# Patient Record
Sex: FEMALE | Race: WHITE | NOT HISPANIC OR LATINO | Employment: OTHER | URBAN - METROPOLITAN AREA
[De-identification: names, ages, dates, MRNs, and addresses within clinical notes are randomized per-mention and may not be internally consistent; named-entity substitution may affect disease eponyms.]

---

## 2023-04-24 PROBLEM — E03.9 HYPOTHYROIDISM: Status: ACTIVE | Noted: 2023-04-24

## 2023-04-24 PROBLEM — R35.0 URINARY FREQUENCY: Status: ACTIVE | Noted: 2023-04-24

## 2023-04-24 PROBLEM — F43.10 PTSD (POST-TRAUMATIC STRESS DISORDER): Status: ACTIVE | Noted: 2023-04-24

## 2023-04-24 PROBLEM — R03.0 ELEVATED BP WITHOUT DIAGNOSIS OF HYPERTENSION: Status: ACTIVE | Noted: 2023-04-24

## 2023-04-24 PROBLEM — M81.0 OSTEOPOROSIS: Status: ACTIVE | Noted: 2023-04-24

## 2023-04-24 PROBLEM — E78.9 ABNORMAL CHOLESTEROL TEST: Status: ACTIVE | Noted: 2023-04-24

## 2023-04-24 RX ORDER — POLYETHYLENE GLYCOL 3350 17 G/17G
17 POWDER, FOR SOLUTION ORAL ONCE
COMMUNITY
Start: 2020-02-06

## 2023-04-24 RX ORDER — FLAXSEED OIL 1000 MG
1 CAPSULE ORAL DAILY
COMMUNITY
Start: 2014-08-28 | End: 2023-04-25 | Stop reason: ALTCHOICE

## 2023-04-24 RX ORDER — BIOTIN 1 MG
1 TABLET ORAL DAILY
COMMUNITY
Start: 2022-11-14

## 2023-04-24 RX ORDER — LEVOTHYROXINE SODIUM 88 UG/1
88 TABLET ORAL
COMMUNITY
Start: 2013-09-09 | End: 2023-11-07

## 2023-04-24 RX ORDER — IBUPROFEN 200 MG
1 TABLET ORAL DAILY
COMMUNITY
Start: 2022-11-14

## 2023-04-24 RX ORDER — CHOLECALCIFEROL (VITAMIN D3) 25 MCG
1 TABLET ORAL DAILY
COMMUNITY
Start: 2017-07-03

## 2023-04-24 NOTE — PROGRESS NOTES
Subjective   Patient ID: Jacqueline Cam is a 78 y.o. female who presents for follow-up visit    HPI   The patient reports a history of constant swelling over the PIP joint of the right thumb over the past month.  She does report that the region has been mildly painful to palpation.  She reports no associated symptoms.  The patient does report that she is right-handed and she has been playing her flute more often over the past month or so.    Over the past 10 days, she also reports a history of daily episodes of aching pain originating in the frontotemporal regions radiating back to the occipital region.  She reports that she will not wake up with the episodes but that the episodes do develop soon after she does wake up.  She reports that the duration of each episode is several hours.  She reports no precipitating, exacerbating or relieving factors.  She does report associated nausea intermittently.  She reports no associated visual changes, slurred speech, focal weakness/paresthesias, phonophobia, photophobia.  Review of Systems    Objective   There were no vitals taken for this visit.    Physical Exam  Eyes-extraocular movements intact.  Pupils equal react to light.  Fundi revealed good retinal color, no hemorrhages or exudates  Musculoskeletal  PIP joint right thumb-diffuse swelling noted but more so at the ulnar surface of the joint.  Full range of motion in all directions of motion with no pain.  Palpation revealed no tenderness or increase in warmth.  Neurologic  Mental status-alert and oriented x3   Cranial nerves-2 through 12 grossly intact, no visual field abnormalities  Motor-no pronator drift noted, strength-5/5 in all muscle groups tested, , no tremor noted.  No bradykinesia noted.  No rigidity noted.  Negative pull test  Sensory-Light touch sensation fully intact  Pinprick sensation fully intact  Vibratory sensation fully intact  Cerebellar-no truncal ataxia, good coordination finger-nose testing,,  good coordination heel-to-shin testing, normal rapid alternating movements  Romberg negative, no abnormality in tandem gait  Reflexes-2+/4 bilaterally except ankle jerks 1+/4 bilaterally  Assessment/Plan        Assessment  Constant swelling noted over the PIP joint of the right thumb-may be secondary to osteoarthritis.  Daily episodes of aching pain in the frontotemporal regions radiating back to the occipital region with associated nausea intermittently-unsure of etiology.  May be secondary to muscle tension headache, chronic daily migraine-unlikely, increased intraocular pressure-unlikely.  I suppose temporal arteritis is a possible etiology but this is less likely.  Plan  Obtain sed rate, TSH, CRP today.  I told the patient that she should cut back on the frequency of playing her flute.  I also recommended that she begin use of Tylenol 1000 mg every 6 hours as needed for the aforementioned headaches.

## 2023-04-25 ENCOUNTER — OFFICE VISIT (OUTPATIENT)
Dept: PRIMARY CARE | Facility: CLINIC | Age: 78
End: 2023-04-25
Payer: MEDICARE

## 2023-04-25 ENCOUNTER — LAB (OUTPATIENT)
Dept: LAB | Facility: LAB | Age: 78
End: 2023-04-25
Payer: MEDICARE

## 2023-04-25 VITALS
DIASTOLIC BLOOD PRESSURE: 84 MMHG | WEIGHT: 128 LBS | HEART RATE: 76 BPM | BODY MASS INDEX: 23.56 KG/M2 | SYSTOLIC BLOOD PRESSURE: 110 MMHG

## 2023-04-25 DIAGNOSIS — K58.1 IRRITABLE BOWEL SYNDROME WITH PREDOMINANT CONSTIPATION: ICD-10-CM

## 2023-04-25 DIAGNOSIS — E03.9 ACQUIRED HYPOTHYROIDISM: ICD-10-CM

## 2023-04-25 DIAGNOSIS — R03.0 ELEVATED BP WITHOUT DIAGNOSIS OF HYPERTENSION: Primary | ICD-10-CM

## 2023-04-25 DIAGNOSIS — M81.0 AGE-RELATED OSTEOPOROSIS WITHOUT CURRENT PATHOLOGICAL FRACTURE: ICD-10-CM

## 2023-04-25 DIAGNOSIS — R35.0 URINARY FREQUENCY: ICD-10-CM

## 2023-04-25 DIAGNOSIS — R03.0 ELEVATED BP WITHOUT DIAGNOSIS OF HYPERTENSION: ICD-10-CM

## 2023-04-25 DIAGNOSIS — G44.52 NEW DAILY PERSISTENT HEADACHE: ICD-10-CM

## 2023-04-25 LAB
C REACTIVE PROTEIN (MG/L) IN SER/PLAS: <0.1 MG/DL
SEDIMENTATION RATE, ERYTHROCYTE: 3 MM/H (ref 0–30)
THYROTROPIN (MIU/L) IN SER/PLAS BY DETECTION LIMIT <= 0.05 MIU/L: 1.23 MIU/L (ref 0.44–3.98)

## 2023-04-25 PROCEDURE — 36415 COLL VENOUS BLD VENIPUNCTURE: CPT

## 2023-04-25 PROCEDURE — 99213 OFFICE O/P EST LOW 20 MIN: CPT | Performed by: INTERNAL MEDICINE

## 2023-04-25 PROCEDURE — 85652 RBC SED RATE AUTOMATED: CPT

## 2023-04-25 PROCEDURE — 84443 ASSAY THYROID STIM HORMONE: CPT

## 2023-04-25 PROCEDURE — 86140 C-REACTIVE PROTEIN: CPT

## 2023-11-07 DIAGNOSIS — E03.9 ACQUIRED HYPOTHYROIDISM: ICD-10-CM

## 2023-11-07 RX ORDER — LEVOTHYROXINE SODIUM 88 UG/1
88 TABLET ORAL DAILY
Qty: 90 TABLET | Refills: 1 | Status: SHIPPED | OUTPATIENT
Start: 2023-11-07 | End: 2024-05-21

## 2023-12-19 NOTE — PROGRESS NOTES
Subjective   Patient ID: Jacqueline Cam is a 78 y.o. female who presents for right ear pain    HPI   The patient reports a history of intermittent episodes of discomfort in the right ear region and under the proximal end of the right mandible beginning in late November.  She reports that the discomfort was not affected by chewing, swallowing, she reports no drainage from the ear.  She does report a history of rhinorrhea and postnasal drip which have been present since the weather change-unchanged towards the end of November.  She reports no other associated symptoms.  The patient presented to the urgent care center on November 27.  She was evaluated and diagnosed with what sounds like otitis externa.  She was given Cortisporin otic solution which she used for 1 week with no change in the above symptoms.  Over the past few days, she reports a history of a constant aching discomfort in the right ear-again not affected by chewing, swallowing.  She also reports diminished hearing in the right ear 2 days..  This morning, the patient experienced a brief episode of vertigo when she woke up in bed with her head turned to the right she then had another momentary episode of vertigo when she turns her head to the left.  She reports no other associated symptoms.  She still reports continued postnasal drip and rhinorrhea-unchanged.  Review of Systems    Objective   There were no vitals taken for this visit.    Physical Exam  Head-palpation revealed no tenderness over the maxillary or frontal sinuses  Right TMJ-no erythema or swelling.  Full range of motion with no pain or click  Ears  Right ear-palpation of the pinna and tragus revealed no increased discomfort.  External auditory canal not erythematous or swollen, TM clear  Nose-turbinates not erythematous or swollen, no septal deviation noted..  Mouth-posterior pharynx not erythematous, tonsillar pillars appeared normal, no exudates  Neck-no lymphadenopathy..  Thyroid gland  not enlarged  Neurologic  Mental status-alert and oriented x3   Cranial nerves-2 through 12 grossly intact, no visual field abnormalities  Motor-no pronator drift noted, strength-5/5 in all muscle groups tested, , no tremor noted.  No bradykinesia noted.  No rigidity noted.  Negative pull test  Sensory-Light touch sensation fully intact  Pinprick sensation fully intact  Vibratory sensation fully intact  Cerebellar-no truncal ataxia, good coordination finger-nose testing,, good coordination heel-to-shin testing, normal rapid alternating movements  Romberg negative, no abnormality in tandem gait  Reflexes-2+/4 bilaterally     Green-Hallpike maneuver negative with rotation of the head bilaterally  Assessment/Plan        Assessment  Constant aching discomfort in the right ear with associated hearing loss-unsure of etiology.  Eustachian tube dysfunction I suppose is a possible etiology.  History of 2 brief episodes of vertigo this morning-may have been secondary to benign paroxysmal positional vertigo  Plan  Begin Flonase spray 1 spray to the right nostril twice daily and Allegra 60 mg p.o. twice daily for the next 10 days.  The patient will return for a follow-up visit in 10 days; she will call me sooner if symptoms progress

## 2023-12-20 ENCOUNTER — OFFICE VISIT (OUTPATIENT)
Dept: PRIMARY CARE | Facility: CLINIC | Age: 78
End: 2023-12-20
Payer: MEDICARE

## 2023-12-20 ENCOUNTER — PHARMACY VISIT (OUTPATIENT)
Dept: PHARMACY | Facility: CLINIC | Age: 78
End: 2023-12-20

## 2023-12-20 VITALS — DIASTOLIC BLOOD PRESSURE: 76 MMHG | HEART RATE: 70 BPM | SYSTOLIC BLOOD PRESSURE: 100 MMHG

## 2023-12-20 DIAGNOSIS — H92.01 OTALGIA OF RIGHT EAR: Primary | ICD-10-CM

## 2023-12-20 DIAGNOSIS — E03.9 ACQUIRED HYPOTHYROIDISM: ICD-10-CM

## 2023-12-20 PROCEDURE — RXOTC WILLOW AMBULATORY OTC CHARGE

## 2023-12-20 PROCEDURE — 99213 OFFICE O/P EST LOW 20 MIN: CPT | Performed by: INTERNAL MEDICINE

## 2023-12-20 PROCEDURE — 1160F RVW MEDS BY RX/DR IN RCRD: CPT | Performed by: INTERNAL MEDICINE

## 2023-12-20 PROCEDURE — 1159F MED LIST DOCD IN RCRD: CPT | Performed by: INTERNAL MEDICINE

## 2023-12-28 NOTE — PROGRESS NOTES
Subjective   Patient ID: Jacqueline Cam is a 78 y.o. female who presents for 10-day follow-up visit    HPI   Since the day after the patient's last office visit, she reports constant sensation of fullness in the right ear which has been waxing and waning since then..  She reports that the sense of fullness has not been affected by chewing or swallowing.  She reports no drainage from the ear.  Over the past week, she reports no recurrent episodes of vertigo, no aching in the right ear and reports no hearing loss..  The patient has been using Flonase spray 1 spray to each nostril twice daily and either Allegra or Claritin on a daily basis  Review of Systems    Objective   There were no vitals taken for this visit.    Physical Exam  Head-palpation revealed no tenderness over the maxillary or frontal sinuses  Right TMJ-no erythema or swelling.  Full range of motion with no pain or click  Ears  Right ear-palpation of the pinna and tragus revealed no increased discomfort.  External auditory canal not erythematous or swollen, TM clear  Nose-turbinates not erythematous or swollen, no septal deviation noted..  Mouth-posterior pharynx not erythematous, tonsillar pillars appeared normal, no exudates  Neck-no lymphadenopathy..  Thyroid gland not enlarged  Assessment/Plan        Assessment/Plan    Assessment  Constant sense of fullness right ear-may be secondary to eustachian tube dysfunction  Plan  Refer to ENT for further evaluation and treatment    Plan

## 2023-12-29 ENCOUNTER — OFFICE VISIT (OUTPATIENT)
Dept: PRIMARY CARE | Facility: CLINIC | Age: 78
End: 2023-12-29
Payer: MEDICARE

## 2023-12-29 VITALS
BODY MASS INDEX: 23.04 KG/M2 | HEART RATE: 60 BPM | SYSTOLIC BLOOD PRESSURE: 100 MMHG | DIASTOLIC BLOOD PRESSURE: 70 MMHG | WEIGHT: 125.2 LBS

## 2023-12-29 DIAGNOSIS — H92.01 OTALGIA OF RIGHT EAR: ICD-10-CM

## 2023-12-29 DIAGNOSIS — H93.8X1 BLOCKED EAR, RIGHT: Primary | ICD-10-CM

## 2023-12-29 PROCEDURE — 1160F RVW MEDS BY RX/DR IN RCRD: CPT | Performed by: INTERNAL MEDICINE

## 2023-12-29 PROCEDURE — 1159F MED LIST DOCD IN RCRD: CPT | Performed by: INTERNAL MEDICINE

## 2023-12-29 PROCEDURE — 99212 OFFICE O/P EST SF 10 MIN: CPT | Performed by: INTERNAL MEDICINE

## 2024-01-24 ENCOUNTER — APPOINTMENT (OUTPATIENT)
Dept: OTOLARYNGOLOGY | Facility: HOSPITAL | Age: 79
End: 2024-01-24
Payer: MEDICARE

## 2024-02-06 ENCOUNTER — OFFICE VISIT (OUTPATIENT)
Dept: OTOLARYNGOLOGY | Facility: CLINIC | Age: 79
End: 2024-02-06
Payer: MEDICARE

## 2024-02-06 ENCOUNTER — CLINICAL SUPPORT (OUTPATIENT)
Dept: AUDIOLOGY | Facility: CLINIC | Age: 79
End: 2024-02-06
Payer: MEDICARE

## 2024-02-06 VITALS — WEIGHT: 125 LBS | HEIGHT: 62 IN | BODY MASS INDEX: 23 KG/M2

## 2024-02-06 DIAGNOSIS — H93.13 TINNITUS, BILATERAL: ICD-10-CM

## 2024-02-06 DIAGNOSIS — H90.3 SENSORINEURAL HEARING LOSS (SNHL) OF BOTH EARS: ICD-10-CM

## 2024-02-06 DIAGNOSIS — H69.91 DYSFUNCTION OF RIGHT EUSTACHIAN TUBE: ICD-10-CM

## 2024-02-06 DIAGNOSIS — H69.91 DYSFUNCTION OF RIGHT EUSTACHIAN TUBE: Primary | ICD-10-CM

## 2024-02-06 DIAGNOSIS — M26.629 TMJ SYNDROME: ICD-10-CM

## 2024-02-06 DIAGNOSIS — H90.A31 MIXED CONDUCTIVE AND SENSORINEURAL HEARING LOSS OF RIGHT EAR WITH RESTRICTED HEARING OF LEFT EAR: ICD-10-CM

## 2024-02-06 DIAGNOSIS — H90.3 SENSORINEURAL HEARING LOSS (SNHL) OF BOTH EARS: Primary | ICD-10-CM

## 2024-02-06 DIAGNOSIS — H93.8X1 BLOCKED EAR, RIGHT: ICD-10-CM

## 2024-02-06 DIAGNOSIS — J30.89 NON-SEASONAL ALLERGIC RHINITIS DUE TO OTHER ALLERGIC TRIGGER: ICD-10-CM

## 2024-02-06 DIAGNOSIS — H92.01 REFERRED OTALGIA OF RIGHT EAR: ICD-10-CM

## 2024-02-06 PROBLEM — I00 RHEUMATIC ARTERITIS: Status: ACTIVE | Noted: 2024-02-06

## 2024-02-06 PROCEDURE — 92567 TYMPANOMETRY: CPT | Performed by: AUDIOLOGIST

## 2024-02-06 PROCEDURE — 92557 COMPREHENSIVE HEARING TEST: CPT | Performed by: AUDIOLOGIST

## 2024-02-06 PROCEDURE — 99204 OFFICE O/P NEW MOD 45 MIN: CPT | Performed by: OTOLARYNGOLOGY

## 2024-02-06 RX ORDER — AZELASTINE 1 MG/ML
2 SPRAY, METERED NASAL 2 TIMES DAILY
Qty: 90 ML | Refills: 0 | Status: SHIPPED | OUTPATIENT
Start: 2024-02-06

## 2024-02-06 RX ORDER — METHYLPREDNISOLONE 4 MG/1
TABLET ORAL
Qty: 21 TABLET | Refills: 0 | Status: SHIPPED | OUTPATIENT
Start: 2024-02-06 | End: 2024-02-07 | Stop reason: WASHOUT

## 2024-02-06 RX ORDER — FLUTICASONE PROPIONATE 50 MCG
2 SPRAY, SUSPENSION (ML) NASAL DAILY
Qty: 48 ML | Refills: 0 | Status: SHIPPED | OUTPATIENT
Start: 2024-02-06

## 2024-02-06 ASSESSMENT — ENCOUNTER SYMPTOMS
RHINORRHEA: 1
TROUBLE SWALLOWING: 1
DIZZINESS: 1
ROS GI COMMENTS: HEARTBURN
EYE ITCHING: 1

## 2024-02-06 NOTE — PROGRESS NOTES
AUDIOLOGIC EVALUATION    Name:  Jacqueline Cam  :  1945  Age:  78 y.o.    HISTORY:     Patient reported her right ear to feel like she is underwater for the past 6 to 8 weeks.  She stated that it started with tingling and pain and now just feels like a dull ache.  She went to her primary care physician who diagnosed her with eustachian tube dysfunction.  She reported to have been doing a lot of sneezing in the fall due to allergies, and noted tinnitus to start bilaterally around that time as well.  She stated that she does not have noticeable hearing loss and does not struggle in certain situations.    EVALUATION:    See Audiogram.    RESULTS:    Otoscopy:  Right: Clear canal with minimal non-occluding cerumen, normal color and appearance of tympanic membrane.  Left: Clear canal with minimal non-occluding cerumen, normal color and appearance of tympanic membrane.    Tympanometry:  Right: Abnormal tympanic membrane mobility (reduced mobility) and normal middle ear pressure.  Left: Normal tympanic membrane mobility and middle ear pressure.    Hearing Sensitivity:  Right: Within normal limits 250 through 4000 Hz precipitously sloping to severe sensorineural hearing loss.  Note: Conductive component at 1000 and 4000 Hz consistent with eustachian tube dysfunction.  Left: Within normal limits 250 through 3000 Hz precipitously sloping to a severe sensorineural hearing loss.    Word Recognition Score (NU-6 by difficulty list 1 and 2):  Right: Excellent (100%) at 70 dB.  Left: Excellent (100%) at 70 dB.      ASSESSMENT AND PLAN:    - Continue medical follow-up with Dr. Mohr.  - Monitor and recheck as warranted.  - Counseled regarding results and recommendations.      MARIA INES Brooks./B.S.  Audiology Student Extern      Lashanda Hernandes  Clinical Audiologist

## 2024-02-06 NOTE — PROGRESS NOTES
Subjective   Patient ID: Jacqueline Cam is a 78 y.o. female  HPI  Patient is complaining of a chronic history of right-sided otalgia.  She has no otorrhea and no vertigo.  She also complains of chronic fullness and congestion in the right ear with rhinorrhea and postnasal drainage.  She has no fevers or chills or nausea or vomiting.  She has no purulence from her nose and no facial pain.    Review of Systems   HENT:  Positive for congestion, ear pain, postnasal drip, rhinorrhea, sneezing, tinnitus and trouble swallowing.         Itchy ears, itchy nose, itchy palate, nasal deformity, scratchy thraot   Eyes:  Positive for itching.   Cardiovascular:         Irregular heart beat   Gastrointestinal:         Heartburn   Neurological:  Positive for dizziness.       Objective   Physical Exam  The following elements of a brief ear nose and throat exam were performed: External ear canals and tympanic membranes, external nose and nasal passages, oral cavity, palpation of the neck, percussion of the face, palpation of the thyroid.    There is nasal edema and the turbinates are pale.  There is a small 2 mm hypopigmented lesion noted on the lateral nasal vestibule on the right side.  There is evidence of bruxism noted on lower teeth and there is exquisite tenderness with palpation of the tympanomandibular joints mostly on the right side.  The right tympanic membrane is slightly retracted with decreased movement with a Valsalva maneuver.  An audiogram and tympanogram were ordered obtained and the results were reviewed today and reveal bilateral mild to severe high-frequency sensorineural hearing loss.  She is also noted to have a type C tympanogram on the right side.      Assessment/Plan   Diagnoses and all orders for this visit:  Dysfunction of right eustachian tube (Primary)  Blocked ear, right  -     Referral to ENT  Non-seasonal allergic rhinitis due to other allergic trigger  -     fluticasone (Flonase) 50 mcg/actuation  nasal spray; Administer 2 sprays into each nostril once daily. Shake gently. Before first use, prime pump. After use, clean tip and replace cap.  -     azelastine (Astelin) 137 mcg (0.1 %) nasal spray; Administer 2 sprays into each nostril 2 times a day.  Sensorineural hearing loss (SNHL) of both ears  -     Tympanometry Only; Future  -     Comprehensive hearing test; Future  Referred otalgia of right ear  TMJ syndrome  -     methylPREDNISolone (Medrol Dospak) 4 mg tablets; Follow schedule on package instructions     1.  Chronic allergic rhinitis.  The patient was started on Flonase and Astelin nasal sprays.  2.  Chronic right eustachian tube dysfunction.  She was advised to perform the Valsalva maneuver on a daily basis.  3.  Chronic right referred otalgia and TMJ syndrome.  The patient was started on a Medrol pack and TMJ precautions and she was referred to her dentist for a bite guard.  4.  Bilateral mild to severe downsloping sensorineural hearing loss.

## 2024-02-07 ENCOUNTER — APPOINTMENT (OUTPATIENT)
Dept: RADIOLOGY | Facility: HOSPITAL | Age: 79
End: 2024-02-07
Payer: MEDICARE

## 2024-02-07 ENCOUNTER — HOSPITAL ENCOUNTER (EMERGENCY)
Facility: HOSPITAL | Age: 79
Discharge: HOME | End: 2024-02-07
Attending: EMERGENCY MEDICINE
Payer: MEDICARE

## 2024-02-07 VITALS
WEIGHT: 124 LBS | SYSTOLIC BLOOD PRESSURE: 153 MMHG | OXYGEN SATURATION: 99 % | BODY MASS INDEX: 22.82 KG/M2 | HEIGHT: 62 IN | DIASTOLIC BLOOD PRESSURE: 79 MMHG | HEART RATE: 89 BPM | RESPIRATION RATE: 16 BRPM | TEMPERATURE: 97.9 F

## 2024-02-07 DIAGNOSIS — S52.501A CLOSED FRACTURE OF DISTAL ENDS OF RIGHT RADIUS AND ULNA, INITIAL ENCOUNTER: Primary | ICD-10-CM
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
## 2024-02-07 DIAGNOSIS — S52.601A CLOSED FRACTURE OF DISTAL ENDS OF RIGHT RADIUS AND ULNA, INITIAL ENCOUNTER: Primary | ICD-10-CM

## 2024-02-07 DIAGNOSIS — S09.90XA CLOSED HEAD INJURY, INITIAL ENCOUNTER: ICD-10-CM

## 2024-02-07 PROCEDURE — 73090 X-RAY EXAM OF FOREARM: CPT | Mod: RIGHT SIDE | Performed by: RADIOLOGY

## 2024-02-07 PROCEDURE — 73110 X-RAY EXAM OF WRIST: CPT | Mod: RIGHT SIDE | Performed by: RADIOLOGY

## 2024-02-07 PROCEDURE — 2500000005 HC RX 250 GENERAL PHARMACY W/O HCPCS: Performed by: EMERGENCY MEDICINE

## 2024-02-07 PROCEDURE — 73130 X-RAY EXAM OF HAND: CPT | Mod: RIGHT SIDE | Performed by: RADIOLOGY

## 2024-02-07 PROCEDURE — 99285 EMERGENCY DEPT VISIT HI MDM: CPT | Mod: 25,27 | Performed by: EMERGENCY MEDICINE

## 2024-02-07 PROCEDURE — 73130 X-RAY EXAM OF HAND: CPT | Mod: RT

## 2024-02-07 PROCEDURE — 71045 X-RAY EXAM CHEST 1 VIEW: CPT | Performed by: RADIOLOGY

## 2024-02-07 PROCEDURE — 73090 X-RAY EXAM OF FOREARM: CPT | Mod: RT

## 2024-02-07 PROCEDURE — 70450 CT HEAD/BRAIN W/O DYE: CPT

## 2024-02-07 PROCEDURE — 25605 CLTX DST RDL FX/EPHYS SEP W/: CPT | Mod: RT

## 2024-02-07 PROCEDURE — 73100 X-RAY EXAM OF WRIST: CPT | Mod: RT

## 2024-02-07 PROCEDURE — 70450 CT HEAD/BRAIN W/O DYE: CPT | Performed by: RADIOLOGY

## 2024-02-07 PROCEDURE — 72125 CT NECK SPINE W/O DYE: CPT

## 2024-02-07 PROCEDURE — 25605 CLTX DST RDL FX/EPHYS SEP W/: CPT | Mod: RT | Performed by: EMERGENCY MEDICINE

## 2024-02-07 PROCEDURE — 71045 X-RAY EXAM CHEST 1 VIEW: CPT

## 2024-02-07 PROCEDURE — 73110 X-RAY EXAM OF WRIST: CPT | Mod: RT

## 2024-02-07 PROCEDURE — 72125 CT NECK SPINE W/O DYE: CPT | Performed by: RADIOLOGY

## 2024-02-07 PROCEDURE — 72170 X-RAY EXAM OF PELVIS: CPT

## 2024-02-07 PROCEDURE — 72170 X-RAY EXAM OF PELVIS: CPT | Performed by: RADIOLOGY

## 2024-02-07 RX ORDER — LIDOCAINE HYDROCHLORIDE 10 MG/ML
10 INJECTION INFILTRATION; PERINEURAL ONCE
Status: COMPLETED | OUTPATIENT
Start: 2024-02-07 | End: 2024-02-07

## 2024-02-07 RX ORDER — ACETAMINOPHEN 325 MG/1
TABLET ORAL
Status: COMPLETED
Start: 2024-02-07 | End: 2024-02-07

## 2024-02-07 RX ORDER — ACETAMINOPHEN 325 MG/1
975 TABLET ORAL ONCE
Status: COMPLETED | OUTPATIENT
Start: 2024-02-07 | End: 2024-02-07

## 2024-02-07 RX ADMIN — ACETAMINOPHEN 650 MG: 325 TABLET ORAL at 16:20

## 2024-02-07 RX ADMIN — LIDOCAINE HYDROCHLORIDE 100 MG: 10 INJECTION, SOLUTION INFILTRATION; PERINEURAL at 15:10

## 2024-02-07 RX ADMIN — ACETAMINOPHEN 975 MG: 325 TABLET ORAL at 14:42

## 2024-02-07 ASSESSMENT — COLUMBIA-SUICIDE SEVERITY RATING SCALE - C-SSRS
6. HAVE YOU EVER DONE ANYTHING, STARTED TO DO ANYTHING, OR PREPARED TO DO ANYTHING TO END YOUR LIFE?: NO
2. HAVE YOU ACTUALLY HAD ANY THOUGHTS OF KILLING YOURSELF?: NO
1. IN THE PAST MONTH, HAVE YOU WISHED YOU WERE DEAD OR WISHED YOU COULD GO TO SLEEP AND NOT WAKE UP?: NO

## 2024-02-07 ASSESSMENT — PAIN DESCRIPTION - LOCATION
LOCATION: WRIST
LOCATION: HAND

## 2024-02-07 ASSESSMENT — PAIN SCALES - GENERAL
PAINLEVEL_OUTOF10: 4
PAINLEVEL_OUTOF10: 7
PAINLEVEL_OUTOF10: 7

## 2024-02-07 ASSESSMENT — PAIN - FUNCTIONAL ASSESSMENT
PAIN_FUNCTIONAL_ASSESSMENT: 0-10
PAIN_FUNCTIONAL_ASSESSMENT: 0-10

## 2024-02-07 ASSESSMENT — PAIN DESCRIPTION - ORIENTATION
ORIENTATION: RIGHT
ORIENTATION: RIGHT

## 2024-02-07 ASSESSMENT — PAIN DESCRIPTION - DESCRIPTORS: DESCRIPTORS: BURNING
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
## 2024-02-08 NOTE — DISCHARGE INSTRUCTIONS
You were seen emergency room today for evaluation of fall.  Your CT scan of the head and neck were reassuring.  Your imaging displayed a comminuted distal radial fracture and displaced ulnar styloid fracture.  This fracture was reduced and splinted.  Please follow-up with orthopedics as discussed.  He can follow-up with Dr. Zamudio or Dr. Zamudio's PA Tonie Perez.  Please return if you have decreased strength or feeling in the hand or fingers.  You may have had a concussion.  Please follow concussion precautions.  Please follow-up with your primary care provider regarding this emergency visit.

## 2024-02-11 NOTE — PROGRESS NOTES
Jacqueline presents today with a new problem of a right distal radius fracture after a mechanical fall on 02/07/24. She obtained a minimally displaced closed extra articular fracture of the radius. A closed reduction was performed in the ER and she presents for follow up. We last saw her in July 2023 for a mucoid cyst on her thumb.    Pain is well controlled no other new complaints.       Past medical history, medications, allergies, surgical history and review of systems are reviewed and otherwise unchanged when compared to last visit on 07/13/23.         Examination:     Constitutional: Oriented to person, place, and time.     Appears well-developed and well-nourished.     Head: Normocephalic and atraumatic.     Eyes: Pupils are equal, round, and reactive to light.     Cardiovascular: Intact distal pulses.     Pulmonary/Chest/Breast: Effort normal. No respiratory distress.     Neurological: Alert and oriented to person, place, and time.     Skin: Skin is warm and dry.     Psychiatric: normal mood and affect. Behavior is normal.     Musculoskeletal: Right arm exam sugar tong splint that extends out onto the fingers. Moderate swelling and ecchymosis of the digits. Sensation intact to light touch         X-rays of the left wrist in splint taken 02/12/24 demonstrate near anatomic alignment of distal radius fracture with associated small radial styloid evulsion.          Impression: Right wrist fracture         Plan: This appears to be a stable injury. Non operative treatment was recommended. She was placed into a better fitting bivalved cast. Instructions for hand elevaton and finger range of motion were provided, She will return in a week and a half for repeat examination and 2 view x-ray of her right wrist.             Erick Zamudio MD          [employer]     Department of Orthopaedic Surgery     Chief of Hand and Upper Extremity Surgery     [facility]     Scribe Attestation  By signing my name below, I, Irais Stone   attest that this documentation has been prepared under the direction and in the presence of Erick Zamudio MD.

## 2024-02-12 ENCOUNTER — OFFICE VISIT (OUTPATIENT)
Dept: ORTHOPEDIC SURGERY | Facility: CLINIC | Age: 79
End: 2024-02-12
Payer: MEDICARE

## 2024-02-12 ENCOUNTER — HOSPITAL ENCOUNTER (OUTPATIENT)
Dept: RADIOLOGY | Facility: CLINIC | Age: 79
Discharge: HOME | End: 2024-02-12
Payer: MEDICARE

## 2024-02-12 DIAGNOSIS — S52.501A CLOSED FRACTURE OF RIGHT DISTAL RADIUS AND ULNA, INITIAL ENCOUNTER: Primary | ICD-10-CM

## 2024-02-12 DIAGNOSIS — S52.501A CLOSED FRACTURE OF RIGHT DISTAL RADIUS AND ULNA, INITIAL ENCOUNTER: ICD-10-CM

## 2024-02-12 DIAGNOSIS — S52.601A CLOSED FRACTURE OF RIGHT DISTAL RADIUS AND ULNA, INITIAL ENCOUNTER: ICD-10-CM

## 2024-02-12 DIAGNOSIS — S52.601A CLOSED FRACTURE OF RIGHT DISTAL RADIUS AND ULNA, INITIAL ENCOUNTER: Primary | ICD-10-CM

## 2024-02-12 PROCEDURE — 1125F AMNT PAIN NOTED PAIN PRSNT: CPT | Performed by: ORTHOPAEDIC SURGERY

## 2024-02-12 PROCEDURE — 73100 X-RAY EXAM OF WRIST: CPT | Mod: RIGHT SIDE | Performed by: RADIOLOGY

## 2024-02-12 PROCEDURE — 1036F TOBACCO NON-USER: CPT | Performed by: ORTHOPAEDIC SURGERY

## 2024-02-12 PROCEDURE — 99213 OFFICE O/P EST LOW 20 MIN: CPT | Performed by: ORTHOPAEDIC SURGERY

## 2024-02-12 PROCEDURE — 73100 X-RAY EXAM OF WRIST: CPT | Mod: RT

## 2024-02-12 PROCEDURE — 1159F MED LIST DOCD IN RCRD: CPT | Performed by: ORTHOPAEDIC SURGERY

## 2024-02-12 ASSESSMENT — ENCOUNTER SYMPTOMS
OCCASIONAL FEELINGS OF UNSTEADINESS: 0
DEPRESSION: 0
LOSS OF SENSATION IN FEET: 0

## 2024-02-12 ASSESSMENT — PAIN - FUNCTIONAL ASSESSMENT: PAIN_FUNCTIONAL_ASSESSMENT: 0-10

## 2024-02-12 ASSESSMENT — PAIN SCALES - GENERAL: PAINLEVEL_OUTOF10: 3

## 2024-02-12 ASSESSMENT — PAIN DESCRIPTION - DESCRIPTORS: DESCRIPTORS: ACHING;SHARP

## 2024-02-15 ENCOUNTER — TELEMEDICINE (OUTPATIENT)
Dept: PRIMARY CARE | Facility: CLINIC | Age: 79
End: 2024-02-15
Payer: MEDICARE

## 2024-02-15 DIAGNOSIS — S00.03XA HEMATOMA OF SCALP, INITIAL ENCOUNTER: Primary | ICD-10-CM

## 2024-02-15 PROCEDURE — 99212 OFFICE O/P EST SF 10 MIN: CPT | Performed by: INTERNAL MEDICINE

## 2024-02-15 PROCEDURE — 1125F AMNT PAIN NOTED PAIN PRSNT: CPT | Performed by: INTERNAL MEDICINE

## 2024-02-15 PROCEDURE — 1159F MED LIST DOCD IN RCRD: CPT | Performed by: INTERNAL MEDICINE

## 2024-02-15 PROCEDURE — 1036F TOBACCO NON-USER: CPT | Performed by: INTERNAL MEDICINE

## 2024-02-15 NOTE — PROGRESS NOTES
Subjective   Patient ID: Jacqueline Cam is a 78 y.o. female who presents for follow-up visit regarding comminuted right distal radius fracture, displaced right ulnar styloid fracture, probable hematoma just posterior to the vertex.    HPI   Since discharge from the emergency department, the patient reports the continued presence of a lump located just posterior to the vertex.  She does report that the lump is tender to palpation.  She reports that the lump has become smaller over the past 7 days.  She reports no visual changes, slurred speech, focal weakness/paresthesias, nausea, vomiting, phonophobia, photophobia.  Since the cast was placed, she reports no pain in the right wrist right forearm region  Review of Systems    Objective   There were no vitals taken for this visit.    Physical Exam  Neurologic  Neurologically, the patient was awake, alert, and oriented to person, place and time. There were no obvious focal neurologic abnormalities.  Assessment/Plan        Assessment  Presence of a lump located just posterior to the vertex-likely represents a hematoma that developed as a result of the patient's fall.  Comminuted right distal radius fracture, displaced ulnar styloid fracture February 7, 2024  Osteoporosis  Hypothyroidism  Plan  I told the patient that she could apply ice to the presumed hematoma 10 to 15 minutes at a time 2-3 times per day as needed.  I also told the patient that it would probably take a few weeks for the hematoma to completely resolve.  I told the patient that if she should develop any new headaches, visual changes, slurred speech, focal weakness, numbness, increased unsteadiness that she should call me immediately.  She will schedule a bone density study and obtain a TSH in late April 2024    9 minutes spent in patient care

## 2024-02-21 DIAGNOSIS — S52.501A CLOSED FRACTURE OF RIGHT DISTAL RADIUS AND ULNA, INITIAL ENCOUNTER: Primary | ICD-10-CM

## 2024-02-21 DIAGNOSIS — S52.601A CLOSED FRACTURE OF RIGHT DISTAL RADIUS AND ULNA, INITIAL ENCOUNTER: Primary | ICD-10-CM

## 2024-02-22 ENCOUNTER — OFFICE VISIT (OUTPATIENT)
Dept: ORTHOPEDIC SURGERY | Facility: CLINIC | Age: 79
End: 2024-02-22
Payer: MEDICARE

## 2024-02-22 ENCOUNTER — HOSPITAL ENCOUNTER (OUTPATIENT)
Dept: RADIOLOGY | Facility: CLINIC | Age: 79
Discharge: HOME | End: 2024-02-22
Payer: MEDICARE

## 2024-02-22 DIAGNOSIS — S52.601A CLOSED FRACTURE OF RIGHT DISTAL RADIUS AND ULNA, INITIAL ENCOUNTER: Primary | ICD-10-CM

## 2024-02-22 DIAGNOSIS — S52.601A CLOSED FRACTURE OF RIGHT DISTAL RADIUS AND ULNA, INITIAL ENCOUNTER: ICD-10-CM

## 2024-02-22 DIAGNOSIS — S52.501A CLOSED FRACTURE OF RIGHT DISTAL RADIUS AND ULNA, INITIAL ENCOUNTER: ICD-10-CM

## 2024-02-22 DIAGNOSIS — S52.501A CLOSED FRACTURE OF RIGHT DISTAL RADIUS AND ULNA, INITIAL ENCOUNTER: Primary | ICD-10-CM

## 2024-02-22 PROCEDURE — 1036F TOBACCO NON-USER: CPT | Performed by: ORTHOPAEDIC SURGERY

## 2024-02-22 PROCEDURE — 1159F MED LIST DOCD IN RCRD: CPT | Performed by: ORTHOPAEDIC SURGERY

## 2024-02-22 PROCEDURE — 73100 X-RAY EXAM OF WRIST: CPT | Mod: RIGHT SIDE | Performed by: RADIOLOGY

## 2024-02-22 PROCEDURE — 73100 X-RAY EXAM OF WRIST: CPT | Mod: RT

## 2024-02-22 PROCEDURE — 1125F AMNT PAIN NOTED PAIN PRSNT: CPT | Performed by: ORTHOPAEDIC SURGERY

## 2024-02-22 PROCEDURE — 99213 OFFICE O/P EST LOW 20 MIN: CPT | Performed by: ORTHOPAEDIC SURGERY

## 2024-02-22 NOTE — PROGRESS NOTES
Jacqueline returns to clinic today for her right distal radius fracture.     Her pain is well controlled, and her cast is comfortable. She has no new issues. She is accompanied by her daughter in law today.          Past medical history, medications, allergies, surgical history and review of systems are reviewed and otherwise unchanged when compared to last visit on [add date of last visit].          Examination:     Constitutional: Oriented to person, place, and time.     Appears well-developed and well-nourished.     Head: Normocephalic and atraumatic.     Eyes: Pupils are equal, round, and reactive to light.     Cardiovascular: Intact distal pulses.     Pulmonary/Chest/Breast: Effort normal. No respiratory distress.     Neurological: Alert and oriented to person, place, and time.     Skin: Skin is warm and dry.     Psychiatric: normal mood and affect. Behavior is normal.     Musculoskeletal: Well fitting short arm cast. Excellent digital range of motion, Comfortable with elbow and forearm range of motion.          X-rays of the right wrist in cast taken 02/22/24 demonstrate maintained acceptable alignment of extraarticular fracture right distal radius         Impression: Right wrist fracture.          Plan: We are going to leave her in cast. She was encouraged to continued elevation and digital range of motion. She will follow up in 2 weeks for clinical exam with x-rays of right wrist out of cast. Depending on the degree of healing and her comfort level, we replace the short arm cast or possibly placed her in a removable splint that she may remove for hygiene purposes.          Erick Zamudio MD          [facility] [employer]     Department of Orthopaedic Surgery     Chief of Hand and Upper Extremity Surgery     [facility]     Scribe Attestation  By signing my name below, I, Berry Stoneibsailaja   attest that this documentation has  been prepared under the direction and in the presence of Erick Zamudio MD.

## 2024-03-06 DIAGNOSIS — S52.501A CLOSED FRACTURE OF RIGHT DISTAL RADIUS AND ULNA, INITIAL ENCOUNTER: Primary | ICD-10-CM

## 2024-03-06 DIAGNOSIS — S52.601A CLOSED FRACTURE OF RIGHT DISTAL RADIUS AND ULNA, INITIAL ENCOUNTER: Primary | ICD-10-CM

## 2024-03-07 ENCOUNTER — HOSPITAL ENCOUNTER (OUTPATIENT)
Dept: RADIOLOGY | Facility: CLINIC | Age: 79
Discharge: HOME | End: 2024-03-07
Payer: MEDICARE

## 2024-03-07 ENCOUNTER — OFFICE VISIT (OUTPATIENT)
Dept: ORTHOPEDIC SURGERY | Facility: CLINIC | Age: 79
End: 2024-03-07
Payer: MEDICARE

## 2024-03-07 VITALS — BODY MASS INDEX: 23 KG/M2 | WEIGHT: 125 LBS | HEIGHT: 62 IN

## 2024-03-07 DIAGNOSIS — S62.101D WRIST FRACTURE, CLOSED, RIGHT, WITH ROUTINE HEALING, SUBSEQUENT ENCOUNTER: Primary | ICD-10-CM

## 2024-03-07 DIAGNOSIS — S52.601A CLOSED FRACTURE OF RIGHT DISTAL RADIUS AND ULNA, INITIAL ENCOUNTER: ICD-10-CM

## 2024-03-07 DIAGNOSIS — S52.501A CLOSED FRACTURE OF RIGHT DISTAL RADIUS AND ULNA, INITIAL ENCOUNTER: ICD-10-CM

## 2024-03-07 PROCEDURE — 1125F AMNT PAIN NOTED PAIN PRSNT: CPT | Performed by: PHYSICIAN ASSISTANT

## 2024-03-07 PROCEDURE — 1036F TOBACCO NON-USER: CPT | Performed by: PHYSICIAN ASSISTANT

## 2024-03-07 PROCEDURE — 73110 X-RAY EXAM OF WRIST: CPT | Mod: RT

## 2024-03-07 PROCEDURE — 99213 OFFICE O/P EST LOW 20 MIN: CPT | Performed by: PHYSICIAN ASSISTANT

## 2024-03-07 PROCEDURE — L3908 WHO COCK-UP NONMOLDE PRE OTS: HCPCS | Performed by: PHYSICIAN ASSISTANT

## 2024-03-07 PROCEDURE — 1159F MED LIST DOCD IN RCRD: CPT | Performed by: PHYSICIAN ASSISTANT

## 2024-03-07 PROCEDURE — 73110 X-RAY EXAM OF WRIST: CPT | Mod: RIGHT SIDE | Performed by: RADIOLOGY

## 2024-03-07 ASSESSMENT — PAIN - FUNCTIONAL ASSESSMENT: PAIN_FUNCTIONAL_ASSESSMENT: NO/DENIES PAIN
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
## 2024-03-08 ENCOUNTER — TELEPHONE (OUTPATIENT)
Dept: PRIMARY CARE | Facility: CLINIC | Age: 79
End: 2024-03-08
Payer: MEDICARE

## 2024-03-14 ENCOUNTER — OFFICE VISIT (OUTPATIENT)
Dept: OTOLARYNGOLOGY | Facility: CLINIC | Age: 79
End: 2024-03-14
Payer: MEDICARE

## 2024-03-14 ENCOUNTER — APPOINTMENT (OUTPATIENT)
Dept: OTOLARYNGOLOGY | Facility: CLINIC | Age: 79
End: 2024-03-14
Payer: MEDICARE

## 2024-03-14 DIAGNOSIS — H69.91 DYSFUNCTION OF RIGHT EUSTACHIAN TUBE: ICD-10-CM

## 2024-03-14 DIAGNOSIS — J30.89 NON-SEASONAL ALLERGIC RHINITIS DUE TO OTHER ALLERGIC TRIGGER: Primary | ICD-10-CM

## 2024-03-14 DIAGNOSIS — H90.3 SENSORINEURAL HEARING LOSS (SNHL) OF BOTH EARS: ICD-10-CM

## 2024-03-14 DIAGNOSIS — H92.01 REFERRED OTALGIA OF RIGHT EAR: ICD-10-CM

## 2024-03-14 DIAGNOSIS — M26.629 TMJ SYNDROME: ICD-10-CM

## 2024-03-14 PROCEDURE — 1160F RVW MEDS BY RX/DR IN RCRD: CPT | Performed by: OTOLARYNGOLOGY

## 2024-03-14 PROCEDURE — 92511 NASOPHARYNGOSCOPY: CPT | Performed by: OTOLARYNGOLOGY

## 2024-03-14 PROCEDURE — 1159F MED LIST DOCD IN RCRD: CPT | Performed by: OTOLARYNGOLOGY

## 2024-03-14 PROCEDURE — 99213 OFFICE O/P EST LOW 20 MIN: CPT | Performed by: OTOLARYNGOLOGY

## 2024-03-14 PROCEDURE — 1036F TOBACCO NON-USER: CPT | Performed by: OTOLARYNGOLOGY

## 2024-03-14 RX ORDER — AZELASTINE 1 MG/ML
2 SPRAY, METERED NASAL 2 TIMES DAILY
Qty: 90 ML | Refills: 1 | Status: SHIPPED | OUTPATIENT
Start: 2024-03-14

## 2024-03-14 RX ORDER — FLUTICASONE PROPIONATE 50 MCG
2 SPRAY, SUSPENSION (ML) NASAL DAILY
Qty: 48 ML | Refills: 1 | Status: SHIPPED | OUTPATIENT
Start: 2024-03-14

## 2024-03-14 NOTE — PROGRESS NOTES
Subjective   Patient ID: Jacqueline Cam is a 79 y.o. female  HPI  Patient presents for follow-up for chronic allergic rhinitis and right referred otalgia and TMJ syndrome and right eustachian tube dysfunction.  She is feeling much better using the Flonase and performing the Valsalva maneuver as needed and also following the TMJ precautions.  Review of Systems    Objective   Physical Exam  The right tympanic membrane is retracted and there is moving with a Valsalva maneuver.  The TMJ tenderness on the right side has resolved.  Fiberoptic nasopharyngoscopy was offered in light of the eustachian tube dysfunction.  The nasal cavity and nasopharynx and hypopharynx were noted to be clear.  The eustachian tube folds are noted to be normal.    Nasopharyngoscopy    Date/Time: 3/14/2024 5:09 PM    Performed by: Ching Mohr MD  Authorized by: Ching Mohr MD    Consent:     Consent obtained:  Verbal    Risks discussed:  Pain  Procedure details:     Indications comment:  Right eustachian tube dysfunction    Meds administered: Lidocaine and Afrin nasal sprays.    Instrument: flexible fiberoptic nasal endoscope      Scope location: bilateral nare        Assessment/Plan   Diagnoses and all orders for this visit:  Non-seasonal allergic rhinitis due to other allergic trigger (Primary)  -     fluticasone (Flonase) 50 mcg/actuation nasal spray; Administer 2 sprays into each nostril once daily. Shake gently. Before first use, prime pump. After use, clean tip and replace cap.  -     azelastine (Astelin) 137 mcg (0.1 %) nasal spray; Administer 2 sprays into each nostril 2 times a day.  Sensorineural hearing loss (SNHL) of both ears  TMJ syndrome  Referred otalgia of right ear  Dysfunction of right eustachian tube  -     Nasopharyngoscopy     1.  Chronic allergic rhinitis controlled with Flonase and Astelin nasal sprays.  2.  Chronic right eustachian tube dysfunction controlled with the Valsalva maneuver as needed.  3.  Chronic  right referred otalgia and TMJ syndrome controlled with TMJ precautions.  Her prescriptions were renewed and she will follow-up in 6 months.

## 2024-04-03 DIAGNOSIS — S52.601A CLOSED FRACTURE OF RIGHT DISTAL RADIUS AND ULNA, INITIAL ENCOUNTER: Primary | ICD-10-CM

## 2024-04-03 DIAGNOSIS — S52.501A CLOSED FRACTURE OF RIGHT DISTAL RADIUS AND ULNA, INITIAL ENCOUNTER: Primary | ICD-10-CM

## 2024-04-04 ENCOUNTER — HOSPITAL ENCOUNTER (OUTPATIENT)
Dept: RADIOLOGY | Facility: CLINIC | Age: 79
Discharge: HOME | End: 2024-04-04
Payer: MEDICARE

## 2024-04-04 ENCOUNTER — OFFICE VISIT (OUTPATIENT)
Dept: ORTHOPEDIC SURGERY | Facility: CLINIC | Age: 79
End: 2024-04-04
Payer: MEDICARE

## 2024-04-04 VITALS — WEIGHT: 125 LBS | BODY MASS INDEX: 23 KG/M2 | HEIGHT: 62 IN

## 2024-04-04 DIAGNOSIS — S62.101D WRIST FRACTURE, CLOSED, RIGHT, WITH ROUTINE HEALING, SUBSEQUENT ENCOUNTER: Primary | ICD-10-CM

## 2024-04-04 DIAGNOSIS — S52.501A CLOSED FRACTURE OF RIGHT DISTAL RADIUS AND ULNA, INITIAL ENCOUNTER: ICD-10-CM

## 2024-04-04 DIAGNOSIS — S52.601A CLOSED FRACTURE OF RIGHT DISTAL RADIUS AND ULNA, INITIAL ENCOUNTER: ICD-10-CM

## 2024-04-04 PROCEDURE — 73110 X-RAY EXAM OF WRIST: CPT | Mod: RT

## 2024-04-04 PROCEDURE — 99213 OFFICE O/P EST LOW 20 MIN: CPT | Performed by: PHYSICIAN ASSISTANT

## 2024-04-04 PROCEDURE — 73110 X-RAY EXAM OF WRIST: CPT | Mod: RIGHT SIDE | Performed by: RADIOLOGY

## 2024-04-04 PROCEDURE — 1036F TOBACCO NON-USER: CPT | Performed by: PHYSICIAN ASSISTANT

## 2024-04-04 PROCEDURE — 1159F MED LIST DOCD IN RCRD: CPT | Performed by: PHYSICIAN ASSISTANT

## 2024-04-04 PROCEDURE — 1160F RVW MEDS BY RX/DR IN RCRD: CPT | Performed by: PHYSICIAN ASSISTANT

## 2024-04-04 ASSESSMENT — PAIN SCALES - GENERAL: PAINLEVEL_OUTOF10: 5 - MODERATE PAIN

## 2024-04-04 ASSESSMENT — PAIN - FUNCTIONAL ASSESSMENT: PAIN_FUNCTIONAL_ASSESSMENT: 0-10

## 2024-04-04 ASSESSMENT — PAIN DESCRIPTION - DESCRIPTORS: DESCRIPTORS: ACHING

## 2024-04-04 NOTE — PROGRESS NOTES
Jacqueline returns to clinic today for her right distal radius fracture that occurred on 2/7/2024.  Overall she is doing well having some ulnar-sided wrist pain and some stiffness.  She is anxious to get back to certain activities including weightlifting and flute playing.    Patient's self reported past medical history, medications, allergies, surgical history, family and social history as well as a 10 point review of systems has been documented in the new patient intake form and scanned into the patient's electronic medical record. Pertinent findings are documented in the HPI.    Physical Examination Findings:  Constitutional: Appears well-developed and well-nourished.  Head: Normocephalic and atraumatic.  Eyes: Pupils are equal and round.  Cardiovascular: Intact distal pulses.   Respiratory: Effort normal. No respiratory distress.  Neurologic: Alert and oriented to person, place, and time.  Skin: Skin is warm and dry.  Hematologic / Lymphatic: No lymphedema, lymphangitis.  Psychiatric: normal mood and affect. Behavior is normal.   Musculoskeletal: Right wrist without significant swelling.  Ulnar bony prominence.  Tenderness palpation over the ulnar aspect of the wrist limitations to full wrist flexion and extension today as well as forearm supination and pronation.  Good digital finger range of motion with slight limitations to full terminal finger flexion specifically of the middle finger.    New x-rays taken today of the right wrist reveal e increased bridging callus formation across the fracture site, radial height loss with dorsal angulation unchanged compared to previous films.  Positive ulnar variance we had a long discussion today regarding her symptoms and x-rays.  At this time    Impression: Right wrist fracture.     Plan: She may slowly start to return back to light activity as she feels comfortable.  She may begin to wean out of the brace as she feels comfortable.  We will get her started in occupational  therapy for rehab.  She may progress her activity as she feels comfortable.  If she continues to have issues over the ulnar aspect of the wrist we can discuss other treatment options including possible steroid injection or discuss further more invasive treatments if necessary.  We will have her follow-up with our office in 2 to 3 months for repeat x-ray of the right wrist.    Nelsy Perez PA-C  Department of Orthopaedic Surgery  [facility]

## 2024-04-12 ENCOUNTER — EVALUATION (OUTPATIENT)
Dept: OCCUPATIONAL THERAPY | Facility: HOSPITAL | Age: 79
End: 2024-04-12
Payer: MEDICARE

## 2024-04-12 DIAGNOSIS — S62.101D WRIST FRACTURE, CLOSED, RIGHT, WITH ROUTINE HEALING, SUBSEQUENT ENCOUNTER: Primary | ICD-10-CM

## 2024-04-12 PROCEDURE — 97165 OT EVAL LOW COMPLEX 30 MIN: CPT | Mod: GO | Performed by: OCCUPATIONAL THERAPIST

## 2024-04-12 PROCEDURE — 97110 THERAPEUTIC EXERCISES: CPT | Mod: GO | Performed by: OCCUPATIONAL THERAPIST

## 2024-04-12 ASSESSMENT — PAIN DESCRIPTION - DESCRIPTORS: DESCRIPTORS: ACHING

## 2024-04-12 ASSESSMENT — ENCOUNTER SYMPTOMS
OCCASIONAL FEELINGS OF UNSTEADINESS: 0
DEPRESSION: 0
LOSS OF SENSATION IN FEET: 0

## 2024-04-12 ASSESSMENT — PAIN SCALES - GENERAL: PAINLEVEL_OUTOF10: 3

## 2024-04-12 ASSESSMENT — PAIN - FUNCTIONAL ASSESSMENT: PAIN_FUNCTIONAL_ASSESSMENT: 0-10
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
## 2024-04-15 ENCOUNTER — TREATMENT (OUTPATIENT)
Dept: OCCUPATIONAL THERAPY | Facility: HOSPITAL | Age: 79
End: 2024-04-15
Payer: MEDICARE

## 2024-04-15 DIAGNOSIS — S62.101D WRIST FRACTURE, CLOSED, RIGHT, WITH ROUTINE HEALING, SUBSEQUENT ENCOUNTER: ICD-10-CM

## 2024-04-15 DIAGNOSIS — S62.101D WRIST FRACTURE, RIGHT, WITH ROUTINE HEALING, SUBSEQUENT ENCOUNTER: Primary | ICD-10-CM

## 2024-04-15 PROCEDURE — 97110 THERAPEUTIC EXERCISES: CPT | Mod: GO | Performed by: OCCUPATIONAL THERAPIST

## 2024-04-15 ASSESSMENT — PAIN SCALES - GENERAL: PAINLEVEL_OUTOF10: 4

## 2024-04-15 ASSESSMENT — PAIN - FUNCTIONAL ASSESSMENT: PAIN_FUNCTIONAL_ASSESSMENT: 0-10

## 2024-04-15 NOTE — PROGRESS NOTES
"    Occupational Therapy  Occupational Therapy Treatment    Patient Name: Jacqueline Cam  MRN: [id number]  Today's Date: 4/15/2024  Time Calculation  Start Time: 1345  Stop Time: 1435  Time Calculation (min): 50 min      Time:  Time Calculation  Start Time: 1345  Stop Time: 1435  Time Calculation (min): 50 min  OT Therapeutic Procedures Time Entry  Manual Therapy Time Entry: 8  Therapeutic Exercise Time Entry: 42    Insurance:  Visit number: 2 of 16  Authorization info: no authorization is needed  Insurance Type: Payor: MEDICARE / Plan: MEDICARE PART A AND B / Product Type: *No Product type* /    General:  Reason for visit: R distal radius and ulna FX's  Referred by: Bethany    Current Problem  1. Wrist fracture, right, with routine healing, subsequent encounter        2. Wrist fracture, closed, right, with routine healing, subsequent encounter  Follow Up In Occupational Therapy          Precautions   none      Subjective:   Patient reports \"I am a little sore today because I might be doing my exercises too much.\"    Performing HEP?: Yes    Pain  Pain Assessment: 0-10  Pain Score: 4  Pain Type: Acute pain  Pain Location: Wrist  Pain Orientation: Right      Objective:     AROM: R FA sup/pron=70/75    Physical Observation: intact   Edema: mild    Sensory: tenderness to touch a R ulnar wrist border   Numbness/Tingling: none    Treatments:     Modalities:       min  Modalities  Modalities Used: Yes  Modality 1: Untimed Fluidotherapy with AROM promotion     Therapeutic Exercise:   42 min  Therapeutic Exercise  Therapeutic Exercise Performed: Yes  Therapeutic Exercise Activity 1: Wrist E/F and UD/RD x 10 reps each  Therapeutic Exercise Activity 2: FA sup/pron x 10 reps each  Therapeutic Exercise Activity 3: Wrist E/F with FA sup/pron x 10 reps ewach  Therapeutic Exercise Activity 4: prayer wrist extension x 10 reps  Therapeutic Exercise Activity 5: juxacizer x 3 reps  Therapeutic Exercise Activity 6: digiflex x " 10    Manual Therapy:     8 min  Manual Therapy  Manual Therapy Performed: Yes  Manual Therapy Activity 1: ulnar wrist border and dorsal FA tissue mobilization.      Assessment: Improvement is noted with AROM of R FA supination and pronation.       Plan: Continue with plan of care 1-2x/wk        Yuni Bowie, OT

## 2024-04-18 ENCOUNTER — TREATMENT (OUTPATIENT)
Dept: OCCUPATIONAL THERAPY | Facility: HOSPITAL | Age: 79
End: 2024-04-18
Payer: MEDICARE

## 2024-04-18 DIAGNOSIS — S62.101D WRIST FRACTURE, CLOSED, RIGHT, WITH ROUTINE HEALING, SUBSEQUENT ENCOUNTER: ICD-10-CM

## 2024-04-18 DIAGNOSIS — S62.101D WRIST FRACTURE, RIGHT, WITH ROUTINE HEALING, SUBSEQUENT ENCOUNTER: Primary | ICD-10-CM

## 2024-04-18 PROCEDURE — 97110 THERAPEUTIC EXERCISES: CPT | Mod: GO | Performed by: OCCUPATIONAL THERAPIST

## 2024-04-18 PROCEDURE — 97140 MANUAL THERAPY 1/> REGIONS: CPT | Mod: GO | Performed by: OCCUPATIONAL THERAPIST

## 2024-04-18 ASSESSMENT — PAIN - FUNCTIONAL ASSESSMENT: PAIN_FUNCTIONAL_ASSESSMENT: 0-10

## 2024-04-18 ASSESSMENT — PAIN SCALES - GENERAL: PAINLEVEL_OUTOF10: 0 - NO PAIN

## 2024-04-22 ENCOUNTER — TREATMENT (OUTPATIENT)
Dept: OCCUPATIONAL THERAPY | Facility: HOSPITAL | Age: 79
End: 2024-04-22
Payer: MEDICARE

## 2024-04-22 DIAGNOSIS — S62.101D WRIST FRACTURE, CLOSED, RIGHT, WITH ROUTINE HEALING, SUBSEQUENT ENCOUNTER: ICD-10-CM

## 2024-04-22 PROCEDURE — 97140 MANUAL THERAPY 1/> REGIONS: CPT | Mod: GO | Performed by: OCCUPATIONAL THERAPIST

## 2024-04-22 PROCEDURE — 97110 THERAPEUTIC EXERCISES: CPT | Mod: GO | Performed by: OCCUPATIONAL THERAPIST

## 2024-04-22 ASSESSMENT — PAIN SCALES - GENERAL: PAINLEVEL_OUTOF10: 6

## 2024-04-22 ASSESSMENT — PAIN - FUNCTIONAL ASSESSMENT: PAIN_FUNCTIONAL_ASSESSMENT: 0-10

## 2024-04-22 NOTE — PROGRESS NOTES
"    Occupational Therapy  Occupational Therapy Treatment    Patient Name: Jacqueline Cam  MRN: [id number]  Today's Date: 4/22/2024  Time Calculation  Start Time: 1500  Stop Time: 1555  Time Calculation (min): 55 min    Time:  Time Calculation  Start Time: 1500  Stop Time: 1555  Time Calculation (min): 55 min  OT Therapeutic Procedures Time Entry  Manual Therapy Time Entry: 8  Therapeutic Exercise Time Entry: 47    Insurance:  Visit number: 4 of 16  Authorization info: no authorization is needed   Insurance Type: Payor: MEDICARE / Plan: MEDICARE PART A AND B / Product Type: *No Product type* /    General:  Reason for visit: R distal radius and ulna FX  Referred by: Bethany    Current Problem  1. Wrist fracture, closed, right, with routine healing, subsequent encounter  Follow Up In Occupational Therapy          Precautions   none      Subjective:   Patient reports \"It is about the same with pain.\"    Performing HEP?: Yes    Pain  Pain Assessment: 0-10  Pain Score: 6  Pain Type: Acute pain  Pain Location: Wrist  Post-tx pain=5/10  Objective:      strength #2 R/L=25/47#    Physical Observation: pronounced ulnar styloid is noted. .  Edema: mild     Sensory: intact   Numbness/Tingling: none    Treatments:     Modalities:       min  Modalities  Modalities Used: Yes  Modality 1: Untimed Fluidotherapy with AROM    Therapeutic Exercise:   47 min  Therapeutic Exercise  Therapeutic Exercise Performed: Yes  Therapeutic Exercise Activity 1: Wrist E/F and UD/RD x 10 reps each  Therapeutic Exercise Activity 2: FA sup/pron with wrist E/F x 10 reps with and without a 1# weight  Therapeutic Exercise Activity 3: prayer wrist extension x 10  Therapeutic Exercise Activity 4: soft theraputty x 10 reps each with full , ext roll, opposition, ext ring, and key pinch (soft theraputty and 6 exercises issued for HEP)    Manual Therapy:     8  min  Manual Therapy  Manual Therapy Performed: Yes  Manual Therapy Activity 1: soft tissue " mobilization    Assessment: Patient tolerated strength progression well.        Plan: Continue with plan of care.       Yuni Bowie, OT

## 2024-04-25 ENCOUNTER — TREATMENT (OUTPATIENT)
Dept: OCCUPATIONAL THERAPY | Facility: HOSPITAL | Age: 79
End: 2024-04-25
Payer: MEDICARE

## 2024-04-25 DIAGNOSIS — S62.101D WRIST FRACTURE, CLOSED, RIGHT, WITH ROUTINE HEALING, SUBSEQUENT ENCOUNTER: ICD-10-CM

## 2024-04-25 PROCEDURE — 97110 THERAPEUTIC EXERCISES: CPT | Mod: GO | Performed by: OCCUPATIONAL THERAPIST

## 2024-04-25 PROCEDURE — 97140 MANUAL THERAPY 1/> REGIONS: CPT | Mod: GO | Performed by: OCCUPATIONAL THERAPIST

## 2024-04-25 ASSESSMENT — PAIN SCALES - GENERAL: PAINLEVEL_OUTOF10: 6

## 2024-04-25 ASSESSMENT — PAIN - FUNCTIONAL ASSESSMENT: PAIN_FUNCTIONAL_ASSESSMENT: 0-10

## 2024-04-25 NOTE — PROGRESS NOTES
"    Occupational Therapy  Occupational Therapy Treatment    Patient Name: Jacqueline Cam  MRN: [id number]  Today's Date: 4/25/2024  Time Calculation  Start Time: 1245  Stop Time: 1340  Time Calculation (min): 55 min        Time:  Time Calculation  Start Time: 1245  Stop Time: 1340  Time Calculation (min): 55 min  OT Therapeutic Procedures Time Entry  Manual Therapy Time Entry: 8  Therapeutic Exercise Time Entry: 47    Insurance:  Visit number: 5 of 16  Authorization info: no authorization   Insurance Type: Payor: MEDICARE / Plan: MEDICARE PART A AND B / Product Type: *No Product type* /    General:  Reason for visit: R distal radius and ulna FX's  Referred by: Bethany    Current Problem  1. Wrist fracture, closed, right, with routine healing, subsequent encounter  Follow Up In Occupational Therapy          Precautions   none      Subjective:   Patient reports \"I have been more sore lately.\"    Performing HEP?: Yes    Pain  Pain Assessment: 0-10  Pain Score: 6  Pain Type: Acute pain  Pain Location: Wrist      Objective:     Patient is 11+ weeks post-op     Physical Observation: ulnar styloid remains pronounced   Edema: mild    Sensory: none  Numbness/Tingling: intact     Treatments:     Modalities:         Modalities  Modalities Used: Yes  Modality 1: Untimed Fluidotherapy with AROM promotion     Therapeutic Exercise:   47 min  Therapeutic Exercise  Therapeutic Exercise Performed: Yes  Therapeutic Exercise Activity 1: Wrist E/F and UD/RD x 15 reps  Therapeutic Exercise Activity 2: FA sup/pron with wrist E/F with use of 1# weight x 10 reps  Therapeutic Exercise Activity 3: prayer wrist extension x 10  Therapeutic Exercise Activity 4: soft theraputty x 10 reps each with full , ext roll, ext ring, opposition, and key pinch  Therapeutic Exercise Activity 5: juxacizer x 5 reps    Manual Therapy:     8 min  Manual Therapy  Manual Therapy Performed: Yes  Manual Therapy Activity 1: soft tissue mobilization to dorsal and " volar FA with use of free-up      Assessment: improved tolerance with all exercises.        Plan: Continue with plan of care 1-2x/wk.       Yuni Bowie, OT

## 2024-04-29 ENCOUNTER — TREATMENT (OUTPATIENT)
Dept: OCCUPATIONAL THERAPY | Facility: HOSPITAL | Age: 79
End: 2024-04-29
Payer: MEDICARE

## 2024-04-29 DIAGNOSIS — S62.101D WRIST FRACTURE, CLOSED, RIGHT, WITH ROUTINE HEALING, SUBSEQUENT ENCOUNTER: ICD-10-CM

## 2024-04-29 PROCEDURE — 97110 THERAPEUTIC EXERCISES: CPT | Mod: GO | Performed by: OCCUPATIONAL THERAPIST

## 2024-04-29 PROCEDURE — A9300 EXERCISE EQUIPMENT: HCPCS | Performed by: OCCUPATIONAL THERAPIST

## 2024-04-29 PROCEDURE — 97140 MANUAL THERAPY 1/> REGIONS: CPT | Mod: GO | Performed by: OCCUPATIONAL THERAPIST

## 2024-04-29 ASSESSMENT — PAIN - FUNCTIONAL ASSESSMENT: PAIN_FUNCTIONAL_ASSESSMENT: 0-10

## 2024-04-29 ASSESSMENT — PAIN SCALES - GENERAL: PAINLEVEL_OUTOF10: 5 - MODERATE PAIN

## 2024-04-29 NOTE — PROGRESS NOTES
"    Occupational Therapy  Occupational Therapy Treatment    Patient Name: Jacqueline Cam  MRN: [id number]  Today's Date: 4/29/2024  Time Calculation  Start Time: 1500  Stop Time: 1555  Time Calculation (min): 55 min      Time:  Time Calculation  Start Time: 1500  Stop Time: 1555  Time Calculation (min): 55 min  OT Therapeutic Procedures Time Entry  Manual Therapy Time Entry: 10  Therapeutic Exercise Time Entry: 45    Insurance:  Visit number: 6 of 16   Authorization info: no  auth is needed   Insurance Type: Payor: MEDICARE / Plan: MEDICARE PART A AND B / Product Type: *No Product type* /    General:  Reason for visit: R distal radius and ulna fractures   Referred by: Bethany     Current Problem  1. Wrist fracture, closed, right, with routine healing, subsequent encounter  Follow Up In Occupational Therapy          Precautions   none      Subjective:   Patient reports \"I mowed my front and back yard today.\"    Performing HEP?: Yes    Pain  Pain Assessment: 0-10  Pain Score: 5 - Moderate pain  Pain Type: Chronic pain  Pain Location: Wrist  Pain Orientation: Right  Pain Radiating Towards: thumb base  Post-tx pain=2/10    Objective:      strength #2 R/L=22/35#    Physical Observation: intact   Edema: mild     Sensory: intact   Numbness/Tingling: none    Treatments:     Modalities:        Modalities  Modalities Used: Yes  Modality 1: Untimed Fluidotherapy with AROM     Therapeutic Exercise:   45 min  Therapeutic Exercise  Therapeutic Exercise Performed: Yes  Therapeutic Exercise Activity 1: wrist E/F and UD/RD x 15  Therapeutic Exercise Activity 2: wrist E/F with FA sup/pron x 10 reps without and x 15 reps with  Therapeutic Exercise Activity 3: prayer wrist extension  x 15 reps  Therapeutic Exercise Activity 4: soft theraputty x 15 reps each  Therapeutic Exercise Activity 5: juxacizer x 5 reps    Manual Therapy:     10 min  Manual Therapy  Manual Therapy Performed: Yes  Manual Therapy Activity 1: soft tissue " mobilization at  R ulnar    Orthosis:        Patient was issued a medium comfort cool soft splint         Assessment: Good tolerance with added reps to all resistive exercises.        Plan: continue with plan of care 1-2x/wk.        Yuni Bowie OT

## 2024-05-02 ENCOUNTER — TREATMENT (OUTPATIENT)
Dept: OCCUPATIONAL THERAPY | Facility: HOSPITAL | Age: 79
End: 2024-05-02
Payer: MEDICARE

## 2024-05-02 DIAGNOSIS — S62.101D WRIST FRACTURE, CLOSED, RIGHT, WITH ROUTINE HEALING, SUBSEQUENT ENCOUNTER: ICD-10-CM

## 2024-05-02 PROCEDURE — 97110 THERAPEUTIC EXERCISES: CPT | Mod: GO | Performed by: OCCUPATIONAL THERAPIST

## 2024-05-02 PROCEDURE — 97140 MANUAL THERAPY 1/> REGIONS: CPT | Mod: GO | Performed by: OCCUPATIONAL THERAPIST

## 2024-05-02 ASSESSMENT — PAIN SCALES - GENERAL: PAINLEVEL_OUTOF10: 0 - NO PAIN

## 2024-05-02 ASSESSMENT — PAIN - FUNCTIONAL ASSESSMENT: PAIN_FUNCTIONAL_ASSESSMENT: 0-10

## 2024-05-02 NOTE — PROGRESS NOTES
"    Occupational Therapy  Occupational Therapy Treatment    Patient Name: Jacqueline Cam  MRN: [id number]  Today's Date: 5/2/2024  Time Calculation  Start Time: 1420  Stop Time: 1505  Time Calculation (min): 45 min        Time:  Time Calculation  Start Time: 1420  Stop Time: 1505  Time Calculation (min): 45 min  OT Therapeutic Procedures Time Entry  Manual Therapy Time Entry: 8  Therapeutic Exercise Time Entry: 37    Insurance:  Visit number: 7 of 16  Authorization info: no authorization is needed   Insurance Type: Payor: MEDICARE / Plan: MEDICARE PART A AND B / Product Type: *No Product type* /    General:  Reason for visit: R distal radius and ulna fx's   Referred by: Bethany     Current Problem  1. Wrist fracture, closed, right, with routine healing, subsequent encounter  Follow Up In Occupational Therapy          Precautions  none       Subjective:   Patient reports \"I don't have any pain when I am sitting still.\"    Performing HEP?: Yes    Pain  Pain Assessment: 0-10  Pain Score: 0 - No pain= Pre-tx pain  Post-tx pain=0/10      Objective:     AROM: R wrist UD/RD=20/15    Physical Observation: pronounced ulnar styloid remains  Edema: mild     Sensory: intact   Numbness/Tingling: none    Treatments:     Modalities:        Modalities  Modalities Used: Yes  Modality 1: Untimed Fluidotherapy with AROM promotion.     Therapeutic Exercise:   37 min  Therapeutic Exercise  Therapeutic Exercise Performed: Yes  Therapeutic Exercise Activity 1: Wrist E/F and UD/RD x 15 reps  Therapeutic Exercise Activity 2: Wrist E/F with FA sup/pron x 15 reps with abd without a 1#weigth.  Therapeutic Exercise Activity 3: Prayer wrsit extension x 15 reps  Therapeutic Exercise Activity 4: BTE program with 3 attachments  Therapeutic Exercise Activity 5: juxacizer x 5 reps  Therapeutic Exercise Activity 6: digiflex x 20 reps with yellow-blue    Manual Therapy:     8 min  Manual Therapy  Manual Therapy Performed: Yes  Manual Therapy Activity " 1: soft tissue mobilization with dorsal and volar FA  with use of Biofreeze.        Assessment: Improvement is noted with RD of R wrist this p.m.        Plan: Continue with plan of care 1-2x/wk.       Yuni Bowie OT

## 2024-05-06 ENCOUNTER — OFFICE VISIT (OUTPATIENT)
Dept: ORTHOPEDIC SURGERY | Facility: CLINIC | Age: 79
End: 2024-05-06
Payer: MEDICARE

## 2024-05-06 ENCOUNTER — TREATMENT (OUTPATIENT)
Dept: OCCUPATIONAL THERAPY | Facility: HOSPITAL | Age: 79
End: 2024-05-06
Payer: MEDICARE

## 2024-05-06 VITALS — BODY MASS INDEX: 23 KG/M2 | HEIGHT: 62 IN | WEIGHT: 125 LBS

## 2024-05-06 DIAGNOSIS — S62.101D WRIST FRACTURE, CLOSED, RIGHT, WITH ROUTINE HEALING, SUBSEQUENT ENCOUNTER: ICD-10-CM

## 2024-05-06 DIAGNOSIS — S62.101D WRIST FRACTURE, CLOSED, RIGHT, WITH ROUTINE HEALING, SUBSEQUENT ENCOUNTER: Primary | ICD-10-CM

## 2024-05-06 PROCEDURE — 1036F TOBACCO NON-USER: CPT | Performed by: ORTHOPAEDIC SURGERY

## 2024-05-06 PROCEDURE — 97140 MANUAL THERAPY 1/> REGIONS: CPT | Mod: GO | Performed by: OCCUPATIONAL THERAPIST

## 2024-05-06 PROCEDURE — 1160F RVW MEDS BY RX/DR IN RCRD: CPT | Performed by: ORTHOPAEDIC SURGERY

## 2024-05-06 PROCEDURE — 97110 THERAPEUTIC EXERCISES: CPT | Mod: GO | Performed by: OCCUPATIONAL THERAPIST

## 2024-05-06 PROCEDURE — 1159F MED LIST DOCD IN RCRD: CPT | Performed by: ORTHOPAEDIC SURGERY

## 2024-05-06 PROCEDURE — 99213 OFFICE O/P EST LOW 20 MIN: CPT | Performed by: ORTHOPAEDIC SURGERY

## 2024-05-06 ASSESSMENT — PAIN SCALES - GENERAL: PAINLEVEL_OUTOF10: 0 - NO PAIN

## 2024-05-06 ASSESSMENT — PAIN - FUNCTIONAL ASSESSMENT
PAIN_FUNCTIONAL_ASSESSMENT: 0-10
PAIN_FUNCTIONAL_ASSESSMENT: NO/DENIES PAIN

## 2024-05-06 NOTE — PROGRESS NOTES
[facility]  Hand and Upper Extremity Service  Follow up visit         Follow up for: Right wrist pain    Interval History: Presenting for follow up of right wrist fracture in which she was treated nonoperatively. She feels that her wrist has improved but reports occasional clicking with movements of the thumb and occasional numbness and tingling of thumb and index finger. She had one episode of sleep disturbances because of the numbness in fingers. Has a comfort cool splint that was provided by her occupational therapist but she hasn't worn it frequently.               Past medical history, medications, allergies, surgical history and review of systems are reviewed and otherwise unchanged when compared to last visit on 2/22/24.         Examination:  Constitutional: Oriented to person, place, and time.  Appears well-developed and well-nourished.  Head: Normocephalic and atraumatic.  Eyes: Pupils are equal, round, and reactive to light.  Cardiovascular: Intact distal pulses.  Pulmonary/Chest/Breast: Effort normal. No respiratory distress.  Neurological: Alert and oriented to person, place, and time.  Skin: Skin is warm and dry.  Psychiatric: normal mood and affect.  Behavior is normal.  Musculoskeletal: Right arm reveals some subtle deformities related to fracture healing but minimal tenderness around distal radius or distal ulna. Positive CMC grind test and is able to reproduce clicking of thumb CMC joint that occurs with thumb CMC joint circumduction. No evidence of any triggering of thumb. Chronic soft tissue mass on ulnar aspect of thumb between MP and IP joints which is nontender. No thenar atrophy. Equivocal Cristy medial nerve compression test but positive Tinel's over the carpal tunnel.               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 If she continues to have pain that isn't improving, treatment options would include thumb CMC joint injections and/or carpal tunnel injections. She is not ready to consider either of these options today.                  Follow up: As needed with worsening of symptoms.              Erick Zamudio MD  [facility]  Department of Orthopaedic Surgery  Hand and Upper Extremity Reconstruction    Scribe Attestation  By signing my name below, I, Irais Faustin   attest that this documentation has been prepared under the direction and in the presence of Dr. Erick Zamudio.    Dictation performed with the use of voice recognition software.  Syntax and grammatical errors may exist.

## 2024-05-06 NOTE — PROGRESS NOTES
"    Occupational Therapy  Occupational Therapy Treatment    Patient Name: Jacqueline Cam  MRN: [id number]  Today's Date: 5/6/2024  Time Calculation  Start Time: 1430  Stop Time: 1525  Time Calculation (min): 55 min      Time:  Time Calculation  Start Time: 1430  Stop Time: 1525  Time Calculation (min): 55 min  OT Therapeutic Procedures Time Entry  Manual Therapy Time Entry: 10  Therapeutic Exercise Time Entry: 45    Insurance:  Visit number: 8 of 16  Authorization info: no authorization  Insurance Type: Payor: MEDICARE / Plan: MEDICARE PART A AND B / Product Type: *No Product type* /    General:  Reason for visit: R distal radius and ulna FX's  Referred by: Bethany    Current Problem  1. Wrist fracture, closed, right, with routine healing, subsequent encounter  Follow Up In Occupational Therapy          Precautions   none      Subjective:   Patient reports \"I worked in my yard and mowed my grass over the weekend.\"    Performing HEP?: Yes    Pain  Pain Assessment: 0-10  Pain Score: 0 - No pain=pre-tx  Post-tx pain=0/10    Objective:      strength #2 R/L=27/41#    Physical Observation: pronounced ulnar styloid  Edema: none    Sensory: intact  Numbness/Tingling: none    Treatments:     Modalities:        Modalities  Modalities Used: Yes  Modality 1: Untimed Fluidotherapy with AROM promotion.     Therapeutic Exercise:   45 min  Therapeutic Exercise  Therapeutic Exercise Performed: Yes  Therapeutic Exercise Activity 1: Wrist E/F and UD/RD x 15 reps each  Therapeutic Exercise Activity 2: Wrist E/F with FA sup/pron x 20 reps with 1# weight.  Therapeutic Exercise Activity 3: Prayer wrist extension x 10 reps ea  Therapeutic Exercise Activity 4: juxacizer x 5 reps  Therapeutic Exercise Activity 5: digiflex x 20 reps each with yellow-black    Manual Therapy:     10 min  Manual Therapy  Manual Therapy Performed: Yes  Manual Therapy Activity 1: soft tissue mobilization with use of Biofreeze    Assessment: Improvement is " noted with functional  strength this p.m.        Plan: Continue with plan of care 1-2x/wk to maximize rehab potential.        Yuni Bowie OT

## 2024-05-09 ENCOUNTER — TREATMENT (OUTPATIENT)
Dept: OCCUPATIONAL THERAPY | Facility: HOSPITAL | Age: 79
End: 2024-05-09
Payer: MEDICARE

## 2024-05-09 DIAGNOSIS — S62.101D WRIST FRACTURE, CLOSED, RIGHT, WITH ROUTINE HEALING, SUBSEQUENT ENCOUNTER: ICD-10-CM

## 2024-05-09 PROCEDURE — 97110 THERAPEUTIC EXERCISES: CPT | Mod: GO | Performed by: OCCUPATIONAL THERAPIST

## 2024-05-09 PROCEDURE — 97140 MANUAL THERAPY 1/> REGIONS: CPT | Mod: GO | Performed by: OCCUPATIONAL THERAPIST

## 2024-05-09 ASSESSMENT — PAIN SCALES - GENERAL: PAINLEVEL_OUTOF10: 6

## 2024-05-09 ASSESSMENT — PAIN - FUNCTIONAL ASSESSMENT: PAIN_FUNCTIONAL_ASSESSMENT: 0-10

## 2024-05-09 NOTE — PROGRESS NOTES
"    Occupational Therapy  Occupational Therapy Treatment    Patient Name: Jacqueline Cam  MRN: [id number]  Today's Date: 5/9/2024  Time Calculation  Start Time: 1400  Stop Time: 1455  Time Calculation (min): 55 min        Time:  Time Calculation  Start Time: 1400  Stop Time: 1455  Time Calculation (min): 55 min  OT Therapeutic Procedures Time Entry  Manual Therapy Time Entry: 10  Therapeutic Exercise Time Entry: 45    Insurance:  Visit number: 9 of 16  Authorization info: no authorization is needed  Insurance Type: Payor: MEDICARE / Plan: MEDICARE PART A AND B / Product Type: *No Product type* /    General:  Reason for visit: R distal radius and ulna FX/s  Referred by: Bethany    Current Problem  1. Wrist fracture, closed, right, with routine healing, subsequent encounter  Follow Up In Occupational Therapy          Precautions   none      Subjective:   Patient reports \"I am more sore today because of the rain.\"    Performing HEP?: Yes    Pain  Pain Assessment: 0-10  Pain Score: 6  Pain Type: Acute pain  Pain Location: Wrist  Pain Orientation: Right  Post-tx pain=2/10    Objective:     AROM: R wrist E/F=75/32    Physical Observation: pronounced ulnar styloid   Edema: intact       Treatments:     Modalities:        Modalities  Modalities Used: Yes  Modality 1: Untimed Fluidotherapy with AROM promotion.     Therapeutic Exercise:   45 min  Therapeutic Exercise  Therapeutic Exercise Performed: Yes  Therapeutic Exercise Activity 1: Wrist E/F with FA sup/pron x 10 reps without and x 20 reps with 1# weight  Therapeutic Exercise Activity 2: prayer wrist extension x 15 reps  Therapeutic Exercise Activity 3: juxacizer x 5 reps  Therapeutic Exercise Activity 4: digiflex x 30 reps each    Manual Therapy:     10 min  Manual Therapy  Manual Therapy Performed: Yes  Manual Therapy Activity 1: soft tissue mobilization to R radial wrist and FA with use of Biofreeze      Assessment: Improvement is noted with wrist extension.    "     Plan: Continue with plan of care with progress noted at next appointment.        Yuni Bowie, OT

## 2024-05-13 ENCOUNTER — TREATMENT (OUTPATIENT)
Dept: OCCUPATIONAL THERAPY | Facility: HOSPITAL | Age: 79
End: 2024-05-13
Payer: MEDICARE

## 2024-05-13 DIAGNOSIS — S62.101D WRIST FRACTURE, CLOSED, RIGHT, WITH ROUTINE HEALING, SUBSEQUENT ENCOUNTER: ICD-10-CM

## 2024-05-13 PROCEDURE — 97110 THERAPEUTIC EXERCISES: CPT | Mod: GO | Performed by: OCCUPATIONAL THERAPIST

## 2024-05-13 PROCEDURE — 97140 MANUAL THERAPY 1/> REGIONS: CPT | Mod: GO | Performed by: OCCUPATIONAL THERAPIST

## 2024-05-13 ASSESSMENT — PAIN - FUNCTIONAL ASSESSMENT: PAIN_FUNCTIONAL_ASSESSMENT: 0-10

## 2024-05-13 ASSESSMENT — PAIN SCALES - GENERAL: PAINLEVEL_OUTOF10: 2

## 2024-05-13 ASSESSMENT — ENCOUNTER SYMPTOMS
LOSS OF SENSATION IN FEET: 0
DEPRESSION: 0
OCCASIONAL FEELINGS OF UNSTEADINESS: 0

## 2024-05-13 NOTE — PROGRESS NOTES
"    Occupational Therapy  Occupational Therapy Orthopedic Progress Note    Patient Name: Jacqueline Cam  MRN: [id number]  Today's Date: 5/13/2024      Time:  Time Calculation  Start Time: 1430  Stop Time: 1530  Time Calculation (min): 60 min  OT Therapeutic Procedures Time Entry  Manual Therapy Time Entry: 10  Therapeutic Exercise Time Entry: 50    Insurance:  Visit number: 10 of 16  Authorization info: no authorization  Insurance Type: Payor: MEDICARE / Plan: MEDICARE PART A AND B / Product Type: *No Product type* /    General:  Reason for visit: R distal radius and ulna FX's  Referred by: Bethany     Current Problem  1. Wrist fracture, closed, right, with routine healing, subsequent encounter  Follow Up In Occupational Therapy          Precautions: none         Subjective:  Patient reports \"I have been doing a lot of yard work.\"    Current Condition:  Better    Pain:  Pain Assessment: 0-10  Pain Score: 2  Pain Type: Chronic pain  Pain Location: Wrist  Pain Orientation: Right  Aggravating Factors: wrist flexion and UD  Relieving Factors:  Rest and Heat     Self Reported Function (0-100%) = 50%  - 100% being back to PLOF    Objective:    AROM: R wrist E/F=70/35, UD/RD=25/13, FA sup/pron=80/80   strength #2 R/L=27/44#    Outcome Measures: Updated 5/13/2024  UEFI: 40/80    EDUCATION: home exercise program, plan of care, activity modifications, pain management, and injury pathology       Goals: Updated 5/13/2024  Active       OT Goals       Patient is independent with entire HEP.  (Met)       Start:  04/12/24    Expected End:  05/12/24    Resolved:  05/13/24         Patient c/o 2/10 or less pain in R hand/wrist with use during ADL's and home exercises.  (Progressing)       Start:  04/12/24    Expected End:  05/12/24            AROM: R wrist flexion=55 degrees or greater to assist with ADL completion.  (Not Progressing)       Start:  04/12/24    Expected End:  05/12/24            R UE AROM is sufficient for " independent completion of all ADL's and home management.  (Progressing)       Start:  04/12/24    Expected End:  06/11/24            R UE strength is sufficient for independent completion of ADL's and home management.  (Progressing)       Start:  04/12/24    Expected End:  06/11/24                Plan of care was developed with input and agreement by the patient    Treatments:     Modalities:       Modalities  Modalities Used: Yes  Modality 1: Untimed Fluidotherapy with AROM promotion     Therapeutic Exercise:    47 min  Therapeutic Exercise  Therapeutic Exercise Performed: Yes  Therapeutic Exercise Activity 1: Wrist E/F with FA beal/pron x 20 reps with and without a 1# weight.  Therapeutic Exercise Activity 2: prayer wrist extension x 20 reps  Therapeutic Exercise Activity 3: juxacizer x 5 reps  Therapeutic Exercise Activity 4: digiflex x 30 reps with all    Manual Therapy:     8 min  Manual Therapy  Manual Therapy Performed: Yes  Manual Therapy Activity 1: soft tissue mobilization to volar and dorsal wrist soft tissue mobilization.      Assessment: Progress is noted in all areas of strength and AROM except R wrist flexion.        Plan:      Planned Interventions include: therapeutic exercise, therapeutic activity, self-care home management, manual therapy, therapeutic activities, gait training, neuromuscular coordination, vasopneumatic, dry needling, aquatic therapy, electric stimulation, fluidotherapy, ultrasound, kinesiotaping, orthosis fabrication, wound care  Frequency: 1-2 x Week  Duration: 4 Weeks    Yuni Bowie OT

## 2024-05-16 ENCOUNTER — TREATMENT (OUTPATIENT)
Dept: OCCUPATIONAL THERAPY | Facility: HOSPITAL | Age: 79
End: 2024-05-16
Payer: MEDICARE

## 2024-05-16 DIAGNOSIS — S62.101D WRIST FRACTURE, CLOSED, RIGHT, WITH ROUTINE HEALING, SUBSEQUENT ENCOUNTER: ICD-10-CM

## 2024-05-16 PROCEDURE — 97110 THERAPEUTIC EXERCISES: CPT | Mod: GO | Performed by: OCCUPATIONAL THERAPIST

## 2024-05-16 PROCEDURE — 97140 MANUAL THERAPY 1/> REGIONS: CPT | Mod: GO | Performed by: OCCUPATIONAL THERAPIST

## 2024-05-16 ASSESSMENT — PAIN - FUNCTIONAL ASSESSMENT: PAIN_FUNCTIONAL_ASSESSMENT: 0-10

## 2024-05-16 ASSESSMENT — PAIN SCALES - GENERAL: PAINLEVEL_OUTOF10: 5 - MODERATE PAIN

## 2024-05-16 NOTE — PROGRESS NOTES
"    Occupational Therapy  Occupational Therapy Treatment    Patient Name: Jacqueline Cam  MRN: [id number]  Today's Date: 5/16/2024  Time Calculation  Start Time: 1415  Stop Time: 1510  Time Calculation (min): 55 min      Time:  Time Calculation  Start Time: 1415  Stop Time: 1510  Time Calculation (min): 55 min  OT Therapeutic Procedures Time Entry  Manual Therapy Time Entry: 10  Therapeutic Exercise Time Entry: 45    Insurance:  Visit number: 11 of 16  Authorization info: no authorization  Insurance Type: Payor: MEDICARE / Plan: MEDICARE PART A AND B / Product Type: *No Product type* /    General:  Reason for visit: R distal radius and ulna fx's  Referred by: Lizzy    Current Problem  1. Wrist fracture, closed, right, with routine healing, subsequent encounter  Follow Up In Occupational Therapy          Precautions  None      Subjective:   Patient reports \"I did a lot of yard work yesterday.\"    Performing HEP?: Yes    Pain  Pain Assessment: 0-10  Pain Score: 5 - Moderate pain  Pain Type: Chronic pain  Pain Location: Wrist  Pain Orientation: Right  Post-TX pain=4/10    Objective:   AROM: R wrist E/F=70/35      Physical Observation: Pronounced ulnar styloid of R wrist.   Edema: none    Sensory: tingling is reported in tips of digits #1-5  Numbness/Tingling: tingling in tips of digits #1-5    Treatments:     Modalities:        Modalities  Modalities Used: Yes  Modality 1: Untimed Fluidotherapy with AROM promotion     Therapeutic Exercise:   45 min  Therapeutic Exercise  Therapeutic Exercise Performed: Yes  Therapeutic Exercise Activity 1: Wrist E/F with FA sup/pron  x 15 reps with 1# and 2# weights  Therapeutic Exercise Activity 2: prayer wrist extension x 15  Therapeutic Exercise Activity 3: juxacier x 5 reps  Therapeutic Exercise Activity 4: digiflex x 30 reps    Manual Therapy:     10 min  Manual Therapy  Manual Therapy Performed: Yes  Manual Therapy Activity 1: soft tissue mobilization to R dorsal and volar FA " tissue mobilization.      Assessment: Good tolerance with increased weight addition to therapeutic exercises.        Plan: Continue with plan of care 1-2x/wk.        Yuni Bowie OT

## 2024-05-20 ENCOUNTER — TREATMENT (OUTPATIENT)
Dept: OCCUPATIONAL THERAPY | Facility: HOSPITAL | Age: 79
End: 2024-05-20
Payer: MEDICARE

## 2024-05-20 DIAGNOSIS — S62.101D WRIST FRACTURE, CLOSED, RIGHT, WITH ROUTINE HEALING, SUBSEQUENT ENCOUNTER: ICD-10-CM

## 2024-05-20 DIAGNOSIS — E03.9 ACQUIRED HYPOTHYROIDISM: ICD-10-CM

## 2024-05-20 PROCEDURE — 97140 MANUAL THERAPY 1/> REGIONS: CPT | Mod: GO | Performed by: OCCUPATIONAL THERAPIST

## 2024-05-20 PROCEDURE — 97110 THERAPEUTIC EXERCISES: CPT | Mod: GO | Performed by: OCCUPATIONAL THERAPIST

## 2024-05-20 ASSESSMENT — PAIN - FUNCTIONAL ASSESSMENT: PAIN_FUNCTIONAL_ASSESSMENT: 0-10

## 2024-05-20 ASSESSMENT — PAIN SCALES - GENERAL: PAINLEVEL_OUTOF10: 4

## 2024-05-20 NOTE — PROGRESS NOTES
"    Occupational Therapy  Occupational Therapy Treatment    Patient Name: Jacqueline Cam  MRN: [id number]  Today's Date: 5/20/2024  Time Calculation  Start Time: 1430  Stop Time: 1515  Time Calculation (min): 45 min      Time:  Time Calculation  Start Time: 1430  Stop Time: 1515  Time Calculation (min): 45 min  OT Therapeutic Procedures Time Entry  Manual Therapy Time Entry: 8  Therapeutic Exercise Time Entry: 37    Insurance:  Visit number: 12 of 16  Authorization info: no authorization   Insurance Type: Payor: MEDICARE / Plan: MEDICARE PART A AND B / Product Type: *No Product type* /    General:  Reason for visit: R distal radius and ulna FX's   Referred by: Bethany    Current Problem  1. Wrist fracture, closed, right, with routine healing, subsequent encounter  Follow Up In Occupational Therapy          Precautions   None       Subjective:   Patient reports \"I was more sore over the weekend.\"    Performing HEP?: Yes    Pain  Pain Assessment: 0-10  Pain Score: 4  Pain Type: Chronic pain  Pain Location: Wrist  Pain Orientation: Right  Post-TX pain=3/10    Objective:     AROM: R wrist E/F=72/35    Physical Observation: Pronounced ulnar styloid   Edema: none    Sensory: intact   Numbness/Tingling: none    Treatments:     Modalities:        Modalities  Modalities Used: Yes  Modality 1: Untimed Fluidotherapy with AROM promotion    Therapeutic Exercise:   37 min  Therapeutic Exercise  Therapeutic Exercise Performed: Yes  Therapeutic Exercise Activity 1: Wrist E/F with FA suip/pron x 15 reps with 1#/2#  Therapeutic Exercise Activity 2: prayer wrist extension x 15  Therapeutic Exercise Activity 3: juxacizer x 5 reps  Therapeutic Exercise Activity 4: digiflex x 40 reps each with yellow-blue    Manual Therapy:     8 min  Manual Therapy  Manual Therapy Performed: Yes  Manual Therapy Activity 1: dorsal and volar wrist and FA sof tissue mobilization    Assessment: Good tolerance with increased repetitions of all exercises.   "     Plan: Continue with plan of care 1-2x/wk       Yuni Bowie, OT

## 2024-05-21 RX ORDER — LEVOTHYROXINE SODIUM 88 UG/1
88 TABLET ORAL DAILY
Qty: 90 TABLET | Refills: 1 | Status: SHIPPED | OUTPATIENT
Start: 2024-05-21

## 2024-05-23 ENCOUNTER — TREATMENT (OUTPATIENT)
Dept: OCCUPATIONAL THERAPY | Facility: HOSPITAL | Age: 79
End: 2024-05-23
Payer: MEDICARE

## 2024-05-23 DIAGNOSIS — S62.101D WRIST FRACTURE, CLOSED, RIGHT, WITH ROUTINE HEALING, SUBSEQUENT ENCOUNTER: ICD-10-CM

## 2024-05-23 PROCEDURE — 97110 THERAPEUTIC EXERCISES: CPT | Mod: GO | Performed by: OCCUPATIONAL THERAPIST

## 2024-05-23 PROCEDURE — 97140 MANUAL THERAPY 1/> REGIONS: CPT | Mod: GO | Performed by: OCCUPATIONAL THERAPIST

## 2024-05-23 ASSESSMENT — PAIN - FUNCTIONAL ASSESSMENT: PAIN_FUNCTIONAL_ASSESSMENT: 0-10

## 2024-05-23 ASSESSMENT — PAIN SCALES - GENERAL: PAINLEVEL_OUTOF10: 3

## 2024-05-23 NOTE — PROGRESS NOTES
"    Occupational Therapy  Occupational Therapy Treatment    Patient Name: Jacqueline Cam  MRN: [id number]  Today's Date: 5/23/2024  Time Calculation  Start Time: 1435  Stop Time: 1525  Time Calculation (min): 50 min      Time:  Time Calculation  Start Time: 1435  Stop Time: 1525  Time Calculation (min): 50 min  OT Therapeutic Procedures Time Entry  Manual Therapy Time Entry: 8  Therapeutic Exercise Time Entry: 47    Insurance:  Visit number: 13 of 16  Authorization info: no authorization   Insurance Type: Payor: MEDICARE / Plan: MEDICARE PART A AND B / Product Type: *No Product type* /    General:  Reason for visit: R Distal Radius & Ulna FX  Referred by: Bethany    Current Problem  1. Wrist fracture, closed, right, with routine healing, subsequent encounter  Follow Up In Occupational Therapy          Precautions   none      Subjective:   Patient reports \"My pain is down today.\"    Performing HEP?: Yes    Pain  Pain Assessment: 0-10  Pain Score: 3  Pain Type: Chronic pain  Pain Location: Wrist  Pain Orientation: Right      Objective:     AROM: R FA sup/pron=80/73    Physical Observation: pronounced ulnar styloid of R wrist   Edema: mild in circumferential wrist     Sensory: intact      Treatments:     Modalities:        Modalities  Modalities Used: Yes  Modality 1: Untimed Fluidotherapy with AROM promotion    Therapeutic Exercise:   47 min  Therapeutic Exercise  Therapeutic Exercise Performed: Yes  Therapeutic Exercise Activity 1: Wrist E/F x FA sup/pron x 20 reps with 1# and 2#  Therapeutic Exercise Activity 2: prayer wrist extension x 20 reps each  Therapeutic Exercise Activity 3: juxacizer x 5 reps  Therapeutic Exercise Activity 4: digiflex x 40 reps each with    Manual Therapy:     8 min  Manual Therapy  Manual Therapy Performed: Yes  Manual Therapy Activity 1: dorsal and volar FA tissue mobilization      Assessment: Improvement is noted with functional supination and pronation of R UE to assist with ADL's.    "     Plan: Continue with plan of care 1-2x/wk.       Yuni Bowie, OT

## 2024-06-06 ENCOUNTER — TREATMENT (OUTPATIENT)
Dept: OCCUPATIONAL THERAPY | Facility: HOSPITAL | Age: 79
End: 2024-06-06
Payer: MEDICARE

## 2024-06-06 DIAGNOSIS — S62.101D WRIST FRACTURE, CLOSED, RIGHT, WITH ROUTINE HEALING, SUBSEQUENT ENCOUNTER: ICD-10-CM

## 2024-06-06 PROCEDURE — 97110 THERAPEUTIC EXERCISES: CPT | Mod: GO | Performed by: OCCUPATIONAL THERAPIST

## 2024-06-06 PROCEDURE — 97140 MANUAL THERAPY 1/> REGIONS: CPT | Mod: GO | Performed by: OCCUPATIONAL THERAPIST

## 2024-06-06 ASSESSMENT — PAIN - FUNCTIONAL ASSESSMENT: PAIN_FUNCTIONAL_ASSESSMENT: 0-10

## 2024-06-06 ASSESSMENT — PAIN SCALES - GENERAL: PAINLEVEL_OUTOF10: 2

## 2024-06-06 NOTE — PROGRESS NOTES
"    Occupational Therapy  Occupational Therapy Treatment    Patient Name: Jacqueline Cam  MRN: [id number]  Today's Date: 6/6/2024  Time Calculation  Start Time: 1415  Stop Time: 1510  Time Calculation (min): 55 min    Time:  Time Calculation  Start Time: 1415  Stop Time: 1510  Time Calculation (min): 55 min  OT Therapeutic Procedures Time Entry  Manual Therapy Time Entry: 10  Therapeutic Exercise Time Entry: 45    Insurance:  Visit number: 14 of 16  Authorization info: no authorization   Insurance Type: Payor: MEDICARE / Plan: MEDICARE PART A AND B / Product Type: *No Product type* /    General:  Reason for visit: R distal radius fx  Referred by: Bethany    Current Problem  1. Wrist fracture, closed, right, with routine healing, subsequent encounter  Follow Up In Occupational Therapy          Precautions  none         Subjective:   Patient reports \"My thumb seems more sore today.\"    Performing HEP?: Yes    Pain  Pain Assessment: 0-10  Pain Score: 2  Pain Type: Chronic pain  Pain Location: Wrist  Pain Orientation: Right  Pain Radiating Towards: thumb    Objective:     AROM: R wrist E/F=75/37    Physical Observation: pronounced ulnar styloid   Edema: mild circumferential wrist     Treatments:     Modalities:        Modalities  Modalities Used: Yes  Modality 1: Untimed Fluidotherapy with AROM promotion.    Therapeutic Exercise:   45 min  Therapeutic Exercise  Therapeutic Exercise Performed: Yes  Therapeutic Exercise Activity 1: Wrist E/F with FA sup/pron x 30 reps with 2# weight  Therapeutic Exercise Activity 2: Thumb AROM x 10 reps each with radial abd, palm abd, opposition, circumduction, composite E/F and IP E/F.  Therapeutic Exercise Activity 3: juxacizer x 5 reps  Therapeutic Exercise Activity 4: digiflex x 40 reps each with yellow-blue    Manual Therapy:     10 min  Manual Therapy  Manual Therapy Performed: Yes  Manual Therapy Activity 1: soft tissue mobilization to dorsal/volar FA and radial side of FA and " thumb base following exercises.    Assessment: Good tolerance with all thumb AROM this p.m. with decreased c/o pain.        Plan: Continue with plan of care 1-2x/wk.        Yuni Bowie, OT

## 2024-06-10 ENCOUNTER — TREATMENT (OUTPATIENT)
Dept: OCCUPATIONAL THERAPY | Facility: HOSPITAL | Age: 79
End: 2024-06-10
Payer: MEDICARE

## 2024-06-10 DIAGNOSIS — S62.101D WRIST FRACTURE, CLOSED, RIGHT, WITH ROUTINE HEALING, SUBSEQUENT ENCOUNTER: ICD-10-CM

## 2024-06-10 PROCEDURE — 97110 THERAPEUTIC EXERCISES: CPT | Mod: GO | Performed by: OCCUPATIONAL THERAPIST

## 2024-06-10 ASSESSMENT — PAIN SCALES - GENERAL: PAINLEVEL_OUTOF10: 2

## 2024-06-10 ASSESSMENT — PAIN - FUNCTIONAL ASSESSMENT: PAIN_FUNCTIONAL_ASSESSMENT: 0-10

## 2024-06-10 NOTE — PROGRESS NOTES
"    Occupational Therapy  Occupational Therapy Treatment    Patient Name: Jacqueline Cam  MRN: [id number]  Today's Date: 6/10/2024  Time Calculation  Start Time: 1430  Stop Time: 1520  Time Calculation (min): 50 min      Time:  Time Calculation  Start Time: 1430  Stop Time: 1520  Time Calculation (min): 50 min  OT Therapeutic Procedures Time Entry  Manual Therapy Time Entry: 8  Therapeutic Exercise Time Entry: 42    Insurance:  Visit number: 15 of 16  Authorization info: no authorization is needed   Insurance Type: Payor: MEDICARE / Plan: MEDICARE PART A AND B / Product Type: *No Product type* /    General:  Reason for visit: R distal radius and ulna fx's   Referred by: Bethany    Current Problem  1. Wrist fracture, closed, right, with routine healing, subsequent encounter  Follow Up In Occupational Therapy          Precautions   none      Subjective:   Patient reports \"My thumb is the worst.\"    Performing HEP?: Yes    Pain  Pain Assessment: 0-10  Pain Score: 2  Pain Type: Chronic pain  Pain Location: Wrist  Pain Orientation: Right  Pain Radiating Towards: thumb      Objective:     AROM: R thumb radial abduction=45 degrees     Physical Observation: pronounced ulnar styloid of R wrist   Edema: none    Treatments:     Modalities:        Modalities  Modalities Used: Yes  Modality 1: Untimed Fluidotherapy with AROM promotion.     Therapeutic Exercise:   42 min  Therapeutic Exercise  Therapeutic Exercise Performed: Yes  Therapeutic Exercise Activity 1: wrist E/F with FA sup/pron x 20 reps with 2# weight  Therapeutic Exercise Activity 2: Thumb AROM x 15 reps ;  Therapeutic Exercise Activity 3: juxacizer x 5 reps  Therapeutic Exercise Activity 4: digiflex x 40 reps each with yellow-blue    Manual Therapy:     8 min  Manual Therapy  Manual Therapy Performed: Yes  Manual Therapy Activity 1: soft tissue mobilization to dorsal and volar wrist/FA soft tissue      Assessment: Good tolerance with all exercises with increased " funtional use of all R digits #2-5 and wrist.  Biggest limitation involves thumb at this time.        Plan: Continue with plan of care 1-2x/wk.        Yuni Bowie, OT

## 2024-06-13 ENCOUNTER — TREATMENT (OUTPATIENT)
Dept: OCCUPATIONAL THERAPY | Facility: HOSPITAL | Age: 79
End: 2024-06-13
Payer: MEDICARE

## 2024-06-13 ENCOUNTER — APPOINTMENT (OUTPATIENT)
Dept: ORTHOPEDIC SURGERY | Facility: CLINIC | Age: 79
End: 2024-06-13
Payer: MEDICARE

## 2024-06-13 DIAGNOSIS — S62.101D WRIST FRACTURE, CLOSED, RIGHT, WITH ROUTINE HEALING, SUBSEQUENT ENCOUNTER: ICD-10-CM

## 2024-06-13 DIAGNOSIS — S62.101D WRIST FRACTURE, RIGHT, WITH ROUTINE HEALING, SUBSEQUENT ENCOUNTER: Primary | ICD-10-CM

## 2024-06-13 PROCEDURE — 97110 THERAPEUTIC EXERCISES: CPT | Mod: GO | Performed by: OCCUPATIONAL THERAPIST

## 2024-06-13 ASSESSMENT — PAIN - FUNCTIONAL ASSESSMENT: PAIN_FUNCTIONAL_ASSESSMENT: 0-10

## 2024-06-13 ASSESSMENT — PAIN SCALES - GENERAL: PAINLEVEL_OUTOF10: 2

## 2024-06-13 NOTE — PROGRESS NOTES
"    Occupational Therapy  Occupational Therapy Treatment    Patient Name: Jacqueline Cam  MRN: [id number]  Today's Date: 6/13/2024  Time Calculation  Start Time: 1415  Stop Time: 1505  Time Calculation (min): 50 min      Time:  Time Calculation  Start Time: 1415  Stop Time: 1505  Time Calculation (min): 50 min  OT Therapeutic Procedures Time Entry  Manual Therapy Time Entry: 8  Therapeutic Exercise Time Entry: 42    Insurance:  Visit number: 16 of 22  Authorization info: no authorization is needed   Insurance Type: Payor: MEDICARE / Plan: MEDICARE PART A AND B / Product Type: *No Product type* /    General:  Reason for visit: R distal radius fx with radial thumb pain   Referred by: Bethany     Current Problem  1. Wrist fracture, right, with routine healing, subsequent encounter        2. Wrist fracture, closed, right, with routine healing, subsequent encounter  Follow Up In Occupational Therapy          Precautions   none      Subjective:   Patient reports \"My thumb is still what gives me the most trouble.\"    Performing HEP?: Yes    Pain  Pain Assessment: 0-10  Pain Score: 2  Pain Type: Chronic pain  Pain Location: Wrist  Pain Orientation: Right  Pain Radiating Towards: thumb    Objective:     AROM: R thumb radial abduction=60     Physical Observation: pronounced ulnar styloid   Edema: none    Sensory: intact  Numbness/Tingling: none    Treatments:     Modalities:        Modalities  Modalities Used: Yes  Modality 1: Untimed Fluidotherapy with AROM promotion     Therapeutic Exercise:   42 min  Therapeutic Exercise  Therapeutic Exercise Performed: Yes  Therapeutic Exercise Activity 1: wrist E/F with FA sup/pron x 20  Therapeutic Exercise Activity 2: thumb AROM x 15 reps each  Therapeutic Exercise Activity 3: juxacizer x 5 reps each  Therapeutic Exercise Activity 4: digiflex x 20 reps each with yellow-black    Manual Therapy:     8 min  Manual Therapy  Manual Therapy Performed: Yes  Manual Therapy Activity 1: soft " tissue mobilization to radial thumb border and  dorsal radial FA.      Assessment: Good tolerance with all resistive functional activities.        Plan: Continue with plan of care 1-2x/wk.        Yuni Bowie, OT

## 2024-06-17 ENCOUNTER — DOCUMENTATION (OUTPATIENT)
Dept: OCCUPATIONAL THERAPY | Facility: HOSPITAL | Age: 79
End: 2024-06-17

## 2024-06-17 ENCOUNTER — OFFICE VISIT (OUTPATIENT)
Dept: PRIMARY CARE | Facility: CLINIC | Age: 79
End: 2024-06-17
Payer: MEDICARE

## 2024-06-17 ENCOUNTER — HOSPITAL ENCOUNTER (OUTPATIENT)
Dept: RADIOLOGY | Facility: CLINIC | Age: 79
Discharge: HOME | End: 2024-06-17
Payer: MEDICARE

## 2024-06-17 ENCOUNTER — APPOINTMENT (OUTPATIENT)
Dept: OCCUPATIONAL THERAPY | Facility: HOSPITAL | Age: 79
End: 2024-06-17
Payer: MEDICARE

## 2024-06-17 VITALS
SYSTOLIC BLOOD PRESSURE: 140 MMHG | BODY MASS INDEX: 21.95 KG/M2 | TEMPERATURE: 98.3 F | DIASTOLIC BLOOD PRESSURE: 100 MMHG | HEART RATE: 92 BPM | WEIGHT: 120 LBS

## 2024-06-17 DIAGNOSIS — M25.551 PAIN OF RIGHT HIP: Primary | ICD-10-CM

## 2024-06-17 DIAGNOSIS — M25.551 PAIN OF RIGHT HIP: ICD-10-CM

## 2024-06-17 PROCEDURE — 1160F RVW MEDS BY RX/DR IN RCRD: CPT | Performed by: INTERNAL MEDICINE

## 2024-06-17 PROCEDURE — 73502 X-RAY EXAM HIP UNI 2-3 VIEWS: CPT | Mod: RT

## 2024-06-17 PROCEDURE — 99213 OFFICE O/P EST LOW 20 MIN: CPT | Performed by: INTERNAL MEDICINE

## 2024-06-17 PROCEDURE — 1159F MED LIST DOCD IN RCRD: CPT | Performed by: INTERNAL MEDICINE

## 2024-06-17 PROCEDURE — 1036F TOBACCO NON-USER: CPT | Performed by: INTERNAL MEDICINE

## 2024-06-17 RX ORDER — METHYLPREDNISOLONE 4 MG/1
TABLET ORAL
Qty: 21 TABLET | Refills: 0 | Status: SHIPPED | OUTPATIENT
Start: 2024-06-17 | End: 2024-06-24

## 2024-06-17 NOTE — PROGRESS NOTES
Subjective   Patient ID: Jacqueline Cam is a 79 y.o. female who presents for Hip Pain (RIGHT HIP PAIN/).    HPI   The patient reports a history of constant sharp burning pain located over the superior lateral surface of the right buttock and right hip x 4 days.  The patient does report an increase in the intensity of the pain with any activity.  She reports no radiation of pain.  She reports no associated right lower extremity weakness/paresthesias.  She reports no other associated symptoms.  She does report that the pain developed the day after she had been working out on a particular machine at the gym during which time she was abducting and adducting the right hip.    Over the past few days, the patient has been taking ibuprofen 200-400 mg plus Tylenol every 4-6 hours with no change in the frequency or intensity of pain.  She also has experienced GI upset.  Review of Systems    Objective   Temp 36.8 °C (98.3 °F) (Temporal)   Wt 54.4 kg (120 lb)   BMI 21.95 kg/m²     Physical Exam  Musculoskeletal  Right hip-no erythema or swelling.  Full range of motion with increased intensity of pain noted in all directions of motion but less so on internal and external rotation.  Palpation did reveal increased tenderness just superior and lateral to the greater trochanter, no increase in warmth  Lumbar spine-no erythema or swelling.  Full range of motion with increased intensity of pain in all directions of motion.  Palpation did reveal increased tenderness over the superior lateral surface of the right buttock, no increase in warmth  Neurologic  Lower extremities-  Motor-strength 5 out of 5 all muscle groups tested  Sensory  Light touch and pinprick sensation fully intact  Reflexes-2+/4 bilaterally  Assessment/Plan        Assessment  Elevated blood pressure-May be secondary to pain.  Constant sharp burning pain located over the superior lateral surface of the right buttock and right hip-May be secondary to muscle strain,  right hip greater trochanteric bursitis.  Plan  Obtain x-rays of the right hip today  Begin Medrol Dosepak as directed.  I told the patient that she could apply Voltaren gel to the aforementioned region every 6 hours as needed and that she could apply ice to the aforementioned region 15 to 20 minutes at a time 4-5 times per day.  The patient will call me with her condition after she completes the Medrol Dosepak

## 2024-06-19 ENCOUNTER — TELEPHONE (OUTPATIENT)
Dept: PRIMARY CARE | Facility: CLINIC | Age: 79
End: 2024-06-19
Payer: MEDICARE

## 2024-06-19 DIAGNOSIS — M25.551 PAIN OF RIGHT HIP: Primary | ICD-10-CM

## 2024-06-19 NOTE — TELEPHONE ENCOUNTER
Patient states that she needs a call back to discuss her right hip patient called twice and still wants a return call back

## 2024-06-24 ENCOUNTER — APPOINTMENT (OUTPATIENT)
Dept: OCCUPATIONAL THERAPY | Facility: HOSPITAL | Age: 79
End: 2024-06-24
Payer: MEDICARE

## 2024-06-24 ENCOUNTER — HOSPITAL ENCOUNTER (OUTPATIENT)
Dept: RADIOLOGY | Facility: CLINIC | Age: 79
Discharge: HOME | End: 2024-06-24
Payer: MEDICARE

## 2024-06-24 ENCOUNTER — APPOINTMENT (OUTPATIENT)
Dept: ORTHOPEDIC SURGERY | Facility: CLINIC | Age: 79
End: 2024-06-24
Payer: MEDICARE

## 2024-06-24 ENCOUNTER — TELEPHONE (OUTPATIENT)
Dept: PHYSICAL THERAPY | Facility: CLINIC | Age: 79
End: 2024-06-24

## 2024-06-24 DIAGNOSIS — M54.16 LUMBAR RADICULOPATHY: Primary | ICD-10-CM

## 2024-06-24 DIAGNOSIS — M54.16 LUMBAR RADICULOPATHY: ICD-10-CM

## 2024-06-24 PROCEDURE — 99203 OFFICE O/P NEW LOW 30 MIN: CPT | Performed by: STUDENT IN AN ORGANIZED HEALTH CARE EDUCATION/TRAINING PROGRAM

## 2024-06-24 PROCEDURE — 72110 X-RAY EXAM L-2 SPINE 4/>VWS: CPT

## 2024-06-24 PROCEDURE — 72110 X-RAY EXAM L-2 SPINE 4/>VWS: CPT | Performed by: RADIOLOGY

## 2024-06-24 NOTE — PROGRESS NOTES
REFERRAL SOURCE: No ref. provider found     CHIEF COMPLAINT: right hip pain    HISTORY OF PRESENT ILLNESS  Jacqueline Cam is a very pleasant 79 y.o. female with history of hypothyroidism, osteoporosis who is here for evaluation of right hip pain.     6/24/24: She complains of right buttock pain that started when she was at the gym on 6/13/2024.  She thinks that it started after doing the adductor machine, but cannot remember a specific injury.  There was no trauma or fall.  She has been walking 3-5 miles a day, but stopped due to this pain.  She describes the pain as burning and aching in the buttock region.  It does not radiate down the leg.  She has a history of low back problems and does have some low back pain as well.  No pain in the groin.  She was given a Medrol Dosepak by her primary care physician that did help with the pain and she completed it yesterday.  She also has pain at night, particularly when lying flat that improves when flexing the knee.    MEDS    Current Outpatient Medications:     b complex-vitamin c (multivitamin, stress formula) tablet, Take 1 tablet by mouth once daily., Disp: , Rfl:     biotin 1 mg tablet, Take 1 tablet (1 mg) by mouth once daily., Disp: , Rfl:     cholecalciferol (Vitamin D-3) 25 MCG (1000 UT) tablet, Take 1 tablet (25 mcg) by mouth once daily., Disp: , Rfl:     L. acidophilus/Bifid. animalis 32 billion cell capsule, Take 1 capsule by mouth 2 times a day., Disp: , Rfl:     levothyroxine (Synthroid, Levoxyl) 88 mcg tablet, TAKE 1 TABLET BY MOUTH EVERY DAY, Disp: 90 tablet, Rfl: 1    methylPREDNISolone (Medrol Dospak) 4 mg tablets, Take as directed on package., Disp: 21 tablet, Rfl: 0    polyethylene glycol (Miralax) 17 gram/dose powder, Take 17 g by mouth 1 time., Disp: , Rfl:     ALLERGIES  Allergies   Allergen Reactions    Benadryl [Diphenhydramine Hcl] Unknown    House Dust Unknown    Iodine Unknown    Neosporin Anti-Itch [Hydrocortisone] Rash       PAST MEDICAL  HISTORY  Past Medical History:   Diagnosis Date    Irritable bowel syndrome with constipation 11/14/2022    Irritable bowel syndrome with constipation    Personal history of other diseases of the respiratory system 12/10/2017    History of asthma    Personal history of other endocrine, nutritional and metabolic disease 12/11/2017    History of hypothyroidism    Personal history of other malignant neoplasm of skin     Personal history of malignant neoplasm of skin    Unspecified sprain of right foot, initial encounter 01/12/2017    Sprain of right foot, initial encounter       PAST SURGICAL HISTORY  Past Surgical History:   Procedure Laterality Date    APPENDECTOMY      CATARACT EXTRACTION  08/28/2014    Cataract Surgery    TONSILLECTOMY  08/28/2014    Tonsillectomy With Adenoidectomy    TUBAL LIGATION         SOCIAL HISTORY   Social History     Socioeconomic History    Marital status: Single     Spouse name: Not on file    Number of children: Not on file    Years of education: Not on file    Highest education level: Not on file   Occupational History    Not on file   Tobacco Use    Smoking status: Former     Types: Cigarettes    Smokeless tobacco: Never   Substance and Sexual Activity    Alcohol use: Not Currently    Drug use: Not on file    Sexual activity: Not on file   Other Topics Concern    Not on file   Social History Narrative    Not on file     Social Determinants of Health     Financial Resource Strain: Not on file   Food Insecurity: Not on file   Transportation Needs: Not on file   Physical Activity: Not on file   Stress: Not on file   Social Connections: Not on file   Intimate Partner Violence: Not on file   Housing Stability: Not on file       FAMILY HISTORY  Family History   Problem Relation Name Age of Onset    Cancer Other      Hypertension Other      Lung disease Other      Hearing loss Other      Allergies Other         REVIEW OF SYSTEMS  Except for those mentioned in the history of present illness,  and below, a complete review of systems is negative.     Review of Systems    VITALS  There were no vitals filed for this visit.    PHYSICAL EXAMINATION   GENERAL:  Awake, alert, and oriented, no apparent distress, pleasant, and cooperative  PSYC: Mood is euthymic, affect is congruent  EAR, NOSE, THROAT:  Normocephalic, atraumatic, moist membranes, anicteric sclera  LUNG: Nonlabored breathing  HEART: No clubbing or cyanosis  SKIN: No increased erythema, warmth, rashes, or concerning skin lesions  NEURO: Sensation is intact in the bilateral lower extremities. Strength is grossly 5 out of 5 throughout the bilateral lower extremities, unless noted below.  Positive straight leg raise.  GAIT: Non-antalgic  MUSCULOSKELETAL: Examination of the right hip: Hip range of motion was full and pain-free. Scour's and FAIR were negative. Stinchfield reproduced her typical buttock pain. Baudilio's was negative. Ganeslen's and P4 are negative.  Tender palpation of the piriformis and sacrum.  No tenderness to palpation over the greater trochanteric region, ASIS, AIIS, adductor tendons, ischial tuberosity. Jase's was negative.    IMAGING STUDIES:   Radiographs of the right hip dated 6/17/2024 were personally reviewed and interpreted by me, Dr. Tonie Jordan, and the findings shared with the patient.  No significant joint space narrowing.  No identified fracture.  Right hip chondrocalcinosis.  Degenerative disc disease at L5-S1.     IMPRESSION  #1 right lumbar radiculopathy    PLAN  The following was discussed with the patient:     Jacqueline Cam is a very pleasant 79 y.o. female with history of hypothyroidism, osteoporosis who is here for evaluation of right hip pain likely due to right lumbar radiculopathy given the positive straight leg raise.  Given how much she is walking, it is not impossible that she could have a sacral bone stress injury contributing to her buttock pain, though this is unlikely to occur after doing a seated  exercise at the gym.  -We discussed the diagnosis of lumbar radiculopathy.  -We will obtain radiographs of the lumbar spine.  -We discussed that physical therapy is the mainstay of treatment and she was given a referral today.  -We discussed the role of meloxicam given that she has had some GI side effects with ibuprofen in the past, but she wished to continue with ibuprofen at this time.  She may call us to switch to meloxicam.  -She was offered a referral to my medical spine colleagues, but she is not interested in injections in the future.  -She will follow-up in 8-10 weeks.  If not improved at that time, we should consider further advanced imaging of the lumbar and sacral region to evaluate for radiculopathy versus bone stress injury.    The patient was counseled to remain active, but avoid activities that worsen symptoms. The patient was in agreement with this plan. All questions were answered to the best of my ability.    PATIENT EDUCATION:  Education was discussed at today's appointment. A learning needs assessment was performed.    Primary learner: Jacqueline Cam  Barriers to learning: None  Preferred language: English  Learning preferences include: Seeing and doing.  Discussed: Diagnosis and treatment plan.  Demonstrated: Understanding of material discussed.  Patient education materials given: None.  Learner response: Learner demonstrated understanding.    This note was dictated using Dragon speech recognition software and was not corrected for spelling or grammatical errors.

## 2024-06-25 ENCOUNTER — EVALUATION (OUTPATIENT)
Dept: PHYSICAL THERAPY | Facility: CLINIC | Age: 79
End: 2024-06-25
Payer: MEDICARE

## 2024-06-25 DIAGNOSIS — M25.551 RIGHT HIP PAIN: Primary | ICD-10-CM

## 2024-06-25 DIAGNOSIS — M54.16 LUMBAR RADICULOPATHY: ICD-10-CM

## 2024-06-25 PROCEDURE — 97110 THERAPEUTIC EXERCISES: CPT | Mod: GP

## 2024-06-25 PROCEDURE — 97162 PT EVAL MOD COMPLEX 30 MIN: CPT | Mod: GP

## 2024-06-25 ASSESSMENT — ENCOUNTER SYMPTOMS
DEPRESSION: 0
LOSS OF SENSATION IN FEET: 0
OCCASIONAL FEELINGS OF UNSTEADINESS: 0

## 2024-06-25 ASSESSMENT — PAIN SCALES - GENERAL: PAINLEVEL_OUTOF10: 6

## 2024-06-25 ASSESSMENT — PAIN - FUNCTIONAL ASSESSMENT: PAIN_FUNCTIONAL_ASSESSMENT: 0-10

## 2024-06-25 NOTE — PROGRESS NOTES
Physical Therapy    Physical Therapy Evaluation    Patient Name: Jacqueline Cam  MRN: [id number]  Today's Date: 6/25/2024  Time Calculation  Start Time: 1115  Stop Time: 1200  Time Calculation (min): 45 min  PT Evaluation Time Entry  PT Evaluation (Moderate) Time Entry: 35  PT Therapeutic Procedures Time Entry  Therapeutic Exercise Time Entry: 10                  Visit #1  Insurance; Medicare  Cert; 6/25/2024 - 9/20/2024  Assessment  Patient presents with acute right hip/Gluteal pain which started about a week and a half ago while working on hip adductor machine. Patient experiencing constant throbbing ache during day and night, which seem to be improving a little after steroid pack and daily Ibuprofen. Patient unable to initiate normal daily walking routine of 3.5 miles and several times a  week trip to gym for upper/lower body workouts. Patient motivated to heal through therapy and agreeable with recommended plan.        Plan  Treatment/Interventions: Education/ Instruction, Manual therapy, Therapeutic activities, Therapeutic exercises  PT Plan: Skilled PT  PT Frequency:  (8 visits; 2 w4)  Certification Period Start Date: 06/25/24  Certification Period End Date: 09/20/24  Rehab Potential: Good  Plan of Care Agreement: Patient    Current Problem  1. Right hip pain        2. Lumbar radiculopathy  Referral to Physical Therapy    Follow Up In Physical Therapy          Subjective   General:  General  Reason for Referral: PT eval and treat; lumbar radiculopathy  Referred By: Tonie Hackett  Past Medical History Relevant to Rehab: Right hip and lower back pain, which started in back or my right hip (Pain started about a week and half ago)  General Comment: I have had hx of back problems but not like this (I was in gym working on hip adductor machine while sitting. That evening right buttock pain got really bad)  Precautions: NA  Precautions  STEADI Fall Risk Score (The score of 4 or more indicates an increased  risk of falling): 0  Vital Signs: NA     Pain:  Pain Assessment: 0-10  0-10 (Numeric) Pain Score: 6  Pain Type: Acute pain (started about 1.5 week ago)  Pain Location:  (Right buttock; right Gluteus Maxiums and Medius)  Pain Orientation: Right  Pain Radiating Towards: pain localized to right Gluteals  Pain Descriptors:  (burning type pain)  Pain Frequency: Constant/continuous  Pain Onset:  (Felt burning in inner groin and right buttock while working on hip adducto machine)  Clinical Progression: Gradually improving (Seem a littel better with Iboprofen. I finished Prednisone pack of 6 days)  Effect of Pain on Daily Activities: I stopped doing some of my regular daily gym routine. (Walked on treadmil 1.5 miles two days ago - pain was there and just did not change. When pain first happened I could not raise my leg up off ground much, but now I can)  Patient's Stated Pain Goal:  (To reduce pain and resume regular workouts and 3.5 miles every day)  Pain Interventions:  (Steroid helped pain. Now on Ibuprofen, improved, but still wiht constant pain)  Home Living: NA     Prior Function Per Pt/Caregiver Report: in gym 3- 5 times a week, usually walking 3.5 miles per day        Objective   Posture: WNL     Range of Motion: Right hip flexion 0-110     Strength: MMT of right hip flexor 4/5 - limited by hip pain  Right hip extension, abduction, adduction 5-/5     Flexibility: NA     Palpation: tenderness/soreness with right Gluteal palpation      Special Tests: +with right hip flexion end range at 110, ER at 45     Gait: normal gait cycle     Outcome Measures:  Modified Oswestry = 38%     OP EDUCATION:  Outpatient Education  Individual(s) Educated: Patient  Education Provided: Home Exercise Program, POC, Body Mechanics  Risk and Benefits Discussed with Patient/Caregiver/Other: yes  Patient/Caregiver Demonstrated Understanding: yes  Plan of Care Discussed and Agreed Upon: yes  Patient Response to Education: Patient/Caregiver  Verbalized Understanding of Information    PT intervention;  Isometric Gluteal isometrics  SLRs  Bridge   Hook lying hip adduction with ball  Access Code: [email or web address]  URL: [email or web address]  Date: 06/25/2024  Prepared by: Cynthia Cassidy    Exercises  - Supine Gluteal Sets  - 1 x daily - 7 x weekly - 3 sets - 10 reps  - Supine Bridge  - 1 x daily - 7 x weekly - 3 sets - 10 reps  - Supine Hip Adduction Isometric with Ball  - 1 x daily - 7 x weekly - 3 sets - 10 reps  - Supine Straight Leg Raises  - 1 x daily - 7 x weekly - 3 sets - 10 reps  Goals:  Active       PT Problem       PT Goal 1       Start:  06/25/24    Expected End:  09/20/24       Patient will reduce difficulties related to functional mobilities and quality of life, as evident by Modified Oswestry score reduction by 50%         PT Goal 2       Start:  06/25/24    Expected End:  09/20/24       Patient will demonstrated improved ROM of  right hip, indicated by goniometric measure equal bilaterally across hips, pain free          PT Goal 3       Start:  06/25/24    Expected End:  09/20/24       Patient will report reduced/abolished pain in right hip, indicated by grade of 0-1/10 on 0-10 pain scale          PT Goal 4       Start:  06/25/24    Expected End:  09/20/24       Patient will demonstrated improved strength of  right hip/leg, evident by MMT of 5/5, and evident by report of ability to resume normal daily walks pain free of with minimal hip discomfort at completion of walks

## 2024-06-26 ENCOUNTER — TREATMENT (OUTPATIENT)
Dept: OCCUPATIONAL THERAPY | Facility: HOSPITAL | Age: 79
End: 2024-06-26
Payer: MEDICARE

## 2024-06-26 ENCOUNTER — DOCUMENTATION (OUTPATIENT)
Dept: ORTHOPEDIC SURGERY | Facility: HOSPITAL | Age: 79
End: 2024-06-26

## 2024-06-26 DIAGNOSIS — S62.101D WRIST FRACTURE, CLOSED, RIGHT, WITH ROUTINE HEALING, SUBSEQUENT ENCOUNTER: ICD-10-CM

## 2024-06-26 PROCEDURE — 97110 THERAPEUTIC EXERCISES: CPT | Mod: GO | Performed by: OCCUPATIONAL THERAPIST

## 2024-06-26 ASSESSMENT — PAIN SCALES - GENERAL: PAINLEVEL_OUTOF10: 0 - NO PAIN

## 2024-06-26 ASSESSMENT — PAIN - FUNCTIONAL ASSESSMENT: PAIN_FUNCTIONAL_ASSESSMENT: 0-10

## 2024-06-26 NOTE — PROGRESS NOTES
"    Occupational Therapy  Occupational Therapy Treatment    Patient Name: Jacqueline Cam  MRN: [id number]  Today's Date: 6/26/2024  Time Calculation  Start Time: 0815  Stop Time: 0903  Time Calculation (min): 48 min        Time:  Time Calculation  Start Time: 0815  Stop Time: 0903  Time Calculation (min): 48 min  OT Therapeutic Procedures Time Entry  Manual Therapy Time Entry: 8  Therapeutic Exercise Time Entry: 40    Insurance:  Visit number: 17 of 22  Authorization info: no authorization is needed  Insurance Type: Payor: MEDICARE / Plan: MEDICARE PART A AND B / Product Type: *No Product type* /    General:  Reason for visit: R distal radius & ulna FX's with thumb basilar joint pain   Referred by: Bethany    Current Problem  1. Wrist fracture, closed, right, with routine healing, subsequent encounter  Follow Up In Occupational Therapy          Precautions   None      Subjective:   Patient reports \"My thumb feels pretty good today.\"    Performing HEP?: Yes    Pain  Pain Assessment: 0-10  0-10 (Numeric) Pain Score: 0 - No pain  Post-TX pain=0/10    Objective:      strength #2 R/L=34/47#    Physical Observation: pronounced ulnar styloid   Edema: intact     Sensory: intact  Numbness/Tingling: none    Treatments:     Modalities:        Modalities  Modalities Used: Yes  Modality 1: Untimed Fluidotherapy with AROM promotion     Therapeutic Exercise:   40 min  Therapeutic Exercise  Therapeutic Exercise Performed: Yes  Therapeutic Exercise Activity 1: wrist E/F with FA sup/pron x 20 reps each with use of 2# weight  Therapeutic Exercise Activity 2: thumb AROM x 20 reps each including thumb series  Therapeutic Exercise Activity 3: juxacizer x 5 reps  Therapeutic Exercise Activity 4: digiflex x 20 reps each    Manual Therapy:     8 min  Manual Therapy  Manual Therapy Performed: Yes  Manual Therapy Activity 1: dorsal FA and thumb base/radial wrist/FA soft tissue mobilization.      Assessment: Decreased pain is reported " this a.m. with increased functional use of R hand/thumb.        Plan: Continue with plan of care 1-2x/wk        Yuni Bowie OT

## 2024-06-26 NOTE — PROGRESS NOTES
I called the patient and left a message with her xray results. It does not appear that her pain is correlated with the possible L1 compression fracture.

## 2024-06-27 ENCOUNTER — TREATMENT (OUTPATIENT)
Dept: PHYSICAL THERAPY | Facility: CLINIC | Age: 79
End: 2024-06-27
Payer: MEDICARE

## 2024-06-27 DIAGNOSIS — M54.16 LUMBAR RADICULOPATHY: ICD-10-CM

## 2024-06-27 PROCEDURE — 97110 THERAPEUTIC EXERCISES: CPT | Mod: GP

## 2024-06-27 NOTE — PROGRESS NOTES
Physical Therapy    Physical Therapy Follow up    Patient Name: Jacqueline Cam  MRN: [id number]  Today's Date: 6/27/2024  Time Calculation  Start Time: 0930  Stop Time: 1015  Time Calculation (min): 45 min     PT Therapeutic Procedures Time Entry  Therapeutic Exercise Time Entry: 45                  Visit #2  Insurance; Medicare  Cert; 6/25/2024 - 9/20/2024  Assessment  Patient presents with acute right hip/Gluteal pain which started about a week and a half ago while working on hip adductor machine. Patient experiencing constant throbbing ache during day and night, which seem to be improving a little after steroid pack and daily Ibuprofen. Patient unable to initiate normal daily walking routine of 3.5 miles and several times a  week trip to gym for upper/lower body workouts. Patient motivated to heal through therapy and agreeable with recommended plan.        Plan; patient education, therex, manual therapy       Current Problem  1. Lumbar radiculopathy  Follow Up In Physical Therapy          Subjective   General: I have done my exercises twice yesterday. Had no problems doing them. I actually feel better, but it hurts more when I do straight leg lifts      Precautions: NA     Vital Signs: NA     Pain: 3-4/10 right lateral hip     Home Living: NA     Prior Function Per Pt/Caregiver Report: in gym 3- 5 times a week, usually walking 3.5 miles per day        Objective   Posture: WNL     Range of Motion: Right hip flexion 0-110     Strength: MMT of right hip flexor 4/5 - limited by hip pain  Right hip extension, abduction, adduction 5-/5     Flexibility: NA     Palpation: tenderness/soreness with right Gluteal palpation      Special Tests: +with right hip flexion end range at 110, ER at 45     Gait: normal gait cycle     Outcome Measures:  Modified Oswestry = 38%   OP EDUCATION:     PT intervention;  SciFit Bike; x 6 minutes  Isometric Gluteal isometrics - HEP  SLRs; - held due to increased lateral hip pain (Gluteus  Medius region)  Bridge 3  x 10, with addition of blue TB around knees  Hook lying hip adduction with ball, and with addition of abdominals contractions with 5 seconds holds x 30  Added left side lying clams  Added off elbows Quadruped Right hip extensions; 3 x 10  Axial right hip destruction/manual mobilization and STM to Gluteus Medius x 5 minutes    Access Code: [email or web address]  URL: [email or web address]  Date: 06/25/2024  Prepared by: Cynthia Cassidy    Exercises  - Supine Gluteal Sets  - 1 x daily - 7 x weekly - 3 sets - 10 reps  - Supine Bridge  - 1 x daily - 7 x weekly - 3 sets - 10 reps  - Supine Hip Adduction Isometric with Ball  - 1 x daily - 7 x weekly - 3 sets - 10 reps  - Supine Straight Leg Raises  - 1 x daily - 7 x weekly - 3 sets - 10 reps  Goals:  Active       PT Problem       PT Goal 1       Start:  06/25/24    Expected End:  09/20/24       Patient will reduce difficulties related to functional mobilities and quality of life, as evident by Modified Oswestry score reduction by 50%         PT Goal 2       Start:  06/25/24    Expected End:  09/20/24       Patient will demonstrated improved ROM of  right hip, indicated by goniometric measure equal bilaterally across hips, pain free          PT Goal 3       Start:  06/25/24    Expected End:  09/20/24       Patient will report reduced/abolished pain in right hip, indicated by grade of 0-1/10 on 0-10 pain scale          PT Goal 4       Start:  06/25/24    Expected End:  09/20/24       Patient will demonstrated improved strength of  right hip/leg, evident by MMT of 5/5, and evident by report of ability to resume normal daily walks pain free of with minimal hip discomfort at completion of walks

## 2024-07-01 ENCOUNTER — TREATMENT (OUTPATIENT)
Dept: OCCUPATIONAL THERAPY | Facility: HOSPITAL | Age: 79
End: 2024-07-01
Payer: MEDICARE

## 2024-07-01 DIAGNOSIS — S62.101D WRIST FRACTURE, CLOSED, RIGHT, WITH ROUTINE HEALING, SUBSEQUENT ENCOUNTER: ICD-10-CM

## 2024-07-01 PROCEDURE — 97140 MANUAL THERAPY 1/> REGIONS: CPT | Mod: GO | Performed by: OCCUPATIONAL THERAPIST

## 2024-07-01 PROCEDURE — 97110 THERAPEUTIC EXERCISES: CPT | Mod: GO | Performed by: OCCUPATIONAL THERAPIST

## 2024-07-01 ASSESSMENT — PAIN - FUNCTIONAL ASSESSMENT: PAIN_FUNCTIONAL_ASSESSMENT: 0-10

## 2024-07-01 ASSESSMENT — PAIN SCALES - GENERAL: PAINLEVEL_OUTOF10: 0 - NO PAIN

## 2024-07-01 NOTE — PROGRESS NOTES
"    Occupational Therapy  Occupational Therapy Treatment    Patient Name: Jacqueline Cam  MRN: [id number]  Today's Date: 7/1/2024  Time Calculation  Start Time: 1415  Stop Time: 1500  Time Calculation (min): 45 min      Time:  Time Calculation  Start Time: 1415  Stop Time: 1500  Time Calculation (min): 45 min  OT Therapeutic Procedures Time Entry  Manual Therapy Time Entry: 8  Therapeutic Exercise Time Entry: 37    Insurance:  Visit number: 18 of 18 with evaluation   Authorization info: no authorization is needed   Insurance Type: Payor: MEDICARE / Plan: MEDICARE PART A AND B / Product Type: *No Product type* /    General:  Reason for visit: R distal radius & ulna FX's; R thumb OA with pain  Referred by: Bethany    Current Problem  1. Wrist fracture, closed, right, with routine healing, subsequent encounter  Follow Up In Occupational Therapy          Precautions   none      Subjective:   Patient reports \"I feel great and don't have any pain.\"    Performing HEP?: Yes    Pain  Pain Assessment: 0-10  0-10 (Numeric) Pain Score: 0 - No pain  Post-tx pain=0/10    Objective:      strength #2 R/L=37/49#    Physical Observation: ulnar styloid remains prominent  Edema: intact     Sensory: intact   Numbness/Tingling: none    Treatments:     Modalities:        Modalities  Modalities Used: Yes  Modality 1: Untimed Fluidotherapy with AROM promotion    Therapeutic Exercise:   37 min  Therapeutic Exercise  Therapeutic Exercise Performed: Yes  Therapeutic Exercise Activity 1: wrist and FA AROM x 15 reps each  Therapeutic Exercise Activity 2: thumb AROM x 15 reps each  Therapeutic Exercise Activity 3: juxacizer x 5 reps  Therapeutic Exercise Activity 4: digiflex x 20 reps each    Manual Therapy:     8 min  Manual Therapy  Manual Therapy Performed: Yes  Manual Therapy Activity 1: dorsal FA and radial thumb base      Assessment: Occupational Therapy Discharge      Patient Name: Jacqueline Cam  MRN: [id number]  Today's Date: " 7/1/2024      Time:  Time Calculation  Start Time: 1415  Stop Time: 1500  Time Calculation (min): 45 min  OT Therapeutic Procedures Time Entry  Manual Therapy Time Entry: 8  Therapeutic Exercise Time Entry: 37    Insurance:  Visit number: 18 of 18   Authorization info: no authorization is needed   Insurance Type: Payor: MEDICARE / Plan: MEDICARE PART A AND B / Product Type: *No Product type* /    Referred by: Bethany  Referred for: R Distal Radius and Ulna FX's      Discharge Information: Date of discharge 7/1/24 and Date of last visit 7/1/24    Therapy Summary:  strength #2 R/L=37/49#; AROM: R wrist E/F=70/35#, UD/RD=25/13#, FA sup/pron=80/80    Discharge Status:  has met her maximum level of rehabilitative potential. She is independent with all ADL's and advanced ADL's.      Rehab Discharge Reason: Achieved all and/or the most significant goals(s)       Plan: Discharge with follow-up as needed        Yuni Bowie, OT

## 2024-07-05 ENCOUNTER — TREATMENT (OUTPATIENT)
Dept: PHYSICAL THERAPY | Facility: CLINIC | Age: 79
End: 2024-07-05
Payer: MEDICARE

## 2024-07-05 DIAGNOSIS — M54.16 LUMBAR RADICULOPATHY: ICD-10-CM

## 2024-07-05 PROCEDURE — 97110 THERAPEUTIC EXERCISES: CPT | Mod: GP

## 2024-07-05 NOTE — PROGRESS NOTES
Physical Therapy    Physical Therapy Follow up    Patient Name: Jacqueline Cam  MRN: [id number]  Today's Date: 7/5/2024  Time Calculation  Start Time: 0930  Stop Time: 1015  Time Calculation (min): 45 min     PT Therapeutic Procedures Time Entry  Therapeutic Exercise Time Entry: 45                  Visit #3  Insurance; Medicare  Cert; 6/25/2024 - 9/20/2024  Assessment  Patient presents with acute right hip/Gluteal pain which started about a week and a half ago while working on hip adductor machine. Patient experiencing constant throbbing ache during day and night, which seem to be improving a little after steroid pack and daily Ibuprofen. Patient unable to initiate normal daily walking routine of 3.5 miles and several times a  week trip to gym for upper/lower body workouts. Patient motivated to heal through therapy and agreeable with recommended plan.        Plan; patient education, therex, manual therapy       Current Problem  1. Lumbar radiculopathy  Follow Up In Physical Therapy          Subjective   General: Had good and productive day yesterday. Tried cutting down to only one Ibuprofen a day, but I think I need to go back to 3. I am back to walking on treadmill. I am up to 20 minutes of walking at this time, and my usual walk is  45 minutes.   Precautions: NA     Vital Signs: NA     Pain: 0/10 right now, but dull ache every once in a while and depending on what I am doing     Home Living: NA     Prior Function Per Pt/Caregiver Report: in gym 3- 5 times a week, usually walking 3.5 miles per day        Objective   Posture: WNL     Range of Motion: Right hip flexion 0-110     Strength: MMT of right hip flexor 4/5 - limited by hip pain  Right hip extension, abduction, adduction 5-/5     Flexibility: NA     Palpation: tenderness/soreness with right Gluteal palpation      Special Tests: +with right hip flexion end range at 110, ER at 45     Gait: normal gait cycle     Outcome Measures:  Modified Oswestry = 38%   OP  EDUCATION:     PT intervention;  SciFit Bike; x 6 minutes  Isometric Gluteal isometrics - HEP  SLRs; - held due to increased lateral hip pain (Gluteus Medius region)  Bridge 3  x 10, with addition of blue TB around knees  Hook lying hip adduction with ball, and with addition of abdominals contractions with 5 seconds holds x 30  Continued left side lying clams using resistance of green TB  Continued Quadruped Right hip extensions; 3 x 10  Added standing right hip flexions, hip abduction, and extension  Added modified squats  Added rows with black TB (provided for HEP)  Tested repeated lumbar flexion      Access Code: [email or web address]  URL: [email or web address]  Date: 06/25/2024  Prepared by: Cynthia Cassidy    Exercises  - Supine Gluteal Sets  - 1 x daily - 7 x weekly - 3 sets - 10 reps  - Supine Bridge  - 1 x daily - 7 x weekly - 3 sets - 10 reps  - Supine Hip Adduction Isometric with Ball  - 1 x daily - 7 x weekly - 3 sets - 10 reps  - Supine Straight Leg Raises  - 1 x daily - 7 x weekly - 3 sets - 10 reps  Goals:  Active       PT Problem       PT Goal 1       Start:  06/25/24    Expected End:  09/20/24       Patient will reduce difficulties related to functional mobilities and quality of life, as evident by Modified Oswestry score reduction by 50%         PT Goal 2       Start:  06/25/24    Expected End:  09/20/24       Patient will demonstrated improved ROM of  right hip, indicated by goniometric measure equal bilaterally across hips, pain free          PT Goal 3       Start:  06/25/24    Expected End:  09/20/24       Patient will report reduced/abolished pain in right hip, indicated by grade of 0-1/10 on 0-10 pain scale          PT Goal 4       Start:  06/25/24    Expected End:  09/20/24       Patient will demonstrated improved strength of  right hip/leg, evident by MMT of 5/5, and evident by report of ability to resume normal daily walks pain free of with minimal hip discomfort at completion of  walks

## 2024-07-09 ENCOUNTER — APPOINTMENT (OUTPATIENT)
Dept: OCCUPATIONAL THERAPY | Facility: HOSPITAL | Age: 79
End: 2024-07-09
Payer: MEDICARE

## 2024-07-09 ENCOUNTER — OFFICE VISIT (OUTPATIENT)
Dept: ORTHOPEDIC SURGERY | Facility: HOSPITAL | Age: 79
End: 2024-07-09
Payer: MEDICARE

## 2024-07-09 VITALS — BODY MASS INDEX: 22.08 KG/M2 | HEIGHT: 62 IN | WEIGHT: 120 LBS

## 2024-07-09 DIAGNOSIS — S62.101D WRIST FRACTURE, CLOSED, RIGHT, WITH ROUTINE HEALING, SUBSEQUENT ENCOUNTER: Primary | ICD-10-CM

## 2024-07-09 PROCEDURE — 99213 OFFICE O/P EST LOW 20 MIN: CPT | Performed by: ORTHOPAEDIC SURGERY

## 2024-07-09 PROCEDURE — 1159F MED LIST DOCD IN RCRD: CPT | Performed by: ORTHOPAEDIC SURGERY

## 2024-07-09 PROCEDURE — 1036F TOBACCO NON-USER: CPT | Performed by: ORTHOPAEDIC SURGERY

## 2024-07-09 ASSESSMENT — PAIN - FUNCTIONAL ASSESSMENT: PAIN_FUNCTIONAL_ASSESSMENT: NO/DENIES PAIN

## 2024-07-09 NOTE — PROGRESS NOTES
[facility]  Hand and Upper Extremity Service  Follow up visit         Follow up for: Right wrist     Interval History: Last seen for possible right carpal tunnel syndrome and sequela of right wrist fracture. She did not want to be treated with injections or surgery at the time. She reports that she's made significant continued improvement and was recently discharged from therapy. She reports no pain and has returned to all of her normal activities. All numbness symptoms have resolved due to therapy modalities.               Past medical history, medications, allergies, surgical history and review of systems are reviewed and otherwise unchanged when compared to last visit on 5/6/24         Examination:  Constitutional: Oriented to person, place, and time.  Appears well-developed and well-nourished.  Head: Normocephalic and atraumatic.  Eyes: Pupils are equal, round, and reactive to light.  Cardiovascular: Intact distal pulses.  Pulmonary/Chest/Breast: Effort normal. No respiratory distress.  Neurological: Alert and oriented to person, place, and time.  Skin: Skin is warm and dry.  Psychiatric: normal mood and affect.  Behavior is normal.  Musculoskeletal: Right hand reveals bony prominence of ulnar head. Full pronation and supination to about 70 degrees. Full digital range of motion. About 25-38 degrees of wrist flexion and extension. Sensation subjectively normal.       Personal Interpretation of Diagnostic studies: No new images obtained       Impression: Sequela of right wrist fracture       Plan: Overall she's doing well and I have no restrictions for her. She can follow up as needed for any further problems or concerns with her hand.       Follow up: As needed             Erick Zamudio MD  [facility]  Department of Orthopaedic Surgery  Hand and Upper Extremity Reconstruction    Scribe Attestation  By signing my name below, I, Jim Woodward ,  Scribe   attest that this documentation has been prepared under the direction and in the presence of Dr. Erick Zamudio.    Dictation performed with the use of voice recognition software.  Syntax and grammatical errors may exist.

## 2024-07-11 ENCOUNTER — TREATMENT (OUTPATIENT)
Dept: PHYSICAL THERAPY | Facility: CLINIC | Age: 79
End: 2024-07-11
Payer: MEDICARE

## 2024-07-11 DIAGNOSIS — M54.16 LUMBAR RADICULOPATHY: Primary | ICD-10-CM

## 2024-07-11 DIAGNOSIS — M25.551 RIGHT HIP PAIN: ICD-10-CM

## 2024-07-11 PROCEDURE — 97110 THERAPEUTIC EXERCISES: CPT | Mod: GP

## 2024-07-11 NOTE — PROGRESS NOTES
Physical Therapy    Physical Therapy Follow up    Patient Name: Jacqueline Cam  MRN: [id number]  Today's Date: 7/11/2024  Time Calculation  Start Time: 0930  Stop Time: 1010  Time Calculation (min): 40 min     PT Therapeutic Procedures Time Entry  Therapeutic Exercise Time Entry: 40                  Visit #4  Insurance; Medicare  Cert; 6/25/2024 - 9/20/2024  Assessment  Patient presents with acute right hip/Gluteal pain which started about a week and a half ago while working on hip adductor machine. Patient experiencing constant throbbing ache during day and night, which seem to be improving a little after steroid pack and daily Ibuprofen. Patient unable to initiate normal daily walking routine of 3.5 miles and several times a  week trip to gym for upper/lower body workouts. Patient motivated to heal through therapy and agreeable with recommended plan.   7/11/2024; TRX with modified row-squats tolerated. Guidance in rest between reps guided in order to avoid any further hip pain exacerbation. Patient requires guidance and correction of exercises pace more so than form. Supine lumbar flexion and right SLR not producing familiar Gluteal pain       Plan; patient education, therex, manual therapy. Patient instructed to try to up walking to 20-25 minutes, with slightly increased speed and grade at level 2 (prior baseline pace still higher, prior grade for walking at level 4(       Current Problem  1. Lumbar radiculopathy  Follow Up In Physical Therapy      2. Right hip pain            Subjective   General: Feeling mild ache of about 3-4/10 in right hip. Walking for about 15 minutes at a time  Precautions: NA     Vital Signs: NA     Pain: 3-4/10 right now, and feeling mostly stiffness    Home Living: NA     Prior Function Per Pt/Caregiver Report: in gym 3- 5 times a week, usually walking 3.5 miles per day        Objective   Posture: WNL     Range of Motion: Right hip flexion 0-110     Strength: MMT of right hip flexor 4/5  - limited by hip pain  Right hip extension, abduction, adduction 5-/5     Flexibility: NA     Palpation: tenderness/soreness with right Gluteal palpation      Special Tests: +with right hip flexion end range at 110, ER at 45     Gait: normal gait cycle     Outcome Measures:  Modified Oswestry = 38%   OP EDUCATION:     PT intervention;  SciFit Bike; x 10 minutes  Isometric Gluteal isometrics - HEP  Bridge 3  x 10, with addition of blue TB around knees  Hook lying hip adduction with ball, and with addition of abdominals contractions with 5 seconds holds x 30  Continued left side lying clams using resistance of green TB  Continued Quadruped Right hip extensions; 3 x 10  Standing right hip flexions, hip abduction, and extension continued  Added rows with TRX, modified with partial squats  Added side steps with green TB under feet, distance of 10 feet x 2 minutes  Added modified lunges by stairs  Tested repeated lumbar flexion      Access Code: [email or web address]  URL: [email or web address]  Date: 06/25/2024  Prepared by: Cynthia Cassidy    Exercises  - Supine Gluteal Sets  - 1 x daily - 7 x weekly - 3 sets - 10 reps  - Supine Bridge  - 1 x daily - 7 x weekly - 3 sets - 10 reps  - Supine Hip Adduction Isometric with Ball  - 1 x daily - 7 x weekly - 3 sets - 10 reps  - Supine Straight Leg Raises  - 1 x daily - 7 x weekly - 3 sets - 10 reps  Goals:  Active       PT Problem       PT Goal 1       Start:  06/25/24    Expected End:  09/20/24       Patient will reduce difficulties related to functional mobilities and quality of life, as evident by Modified Oswestry score reduction by 50%         PT Goal 2       Start:  06/25/24    Expected End:  09/20/24       Patient will demonstrated improved ROM of  right hip, indicated by goniometric measure equal bilaterally across hips, pain free          PT Goal 3       Start:  06/25/24    Expected End:  09/20/24       Patient will report reduced/abolished pain in right hip,  indicated by grade of 0-1/10 on 0-10 pain scale          PT Goal 4       Start:  06/25/24    Expected End:  09/20/24       Patient will demonstrated improved strength of  right hip/leg, evident by MMT of 5/5, and evident by report of ability to resume normal daily walks pain free of with minimal hip discomfort at completion of walks

## 2024-07-15 ENCOUNTER — APPOINTMENT (OUTPATIENT)
Dept: OCCUPATIONAL THERAPY | Facility: HOSPITAL | Age: 79
End: 2024-07-15
Payer: MEDICARE

## 2024-07-15 ENCOUNTER — TREATMENT (OUTPATIENT)
Dept: PHYSICAL THERAPY | Facility: CLINIC | Age: 79
End: 2024-07-15
Payer: MEDICARE

## 2024-07-15 DIAGNOSIS — M54.16 LUMBAR RADICULOPATHY: ICD-10-CM

## 2024-07-15 DIAGNOSIS — M25.551 RIGHT HIP PAIN: Primary | ICD-10-CM

## 2024-07-15 PROCEDURE — 97110 THERAPEUTIC EXERCISES: CPT | Mod: GP

## 2024-07-15 NOTE — PROGRESS NOTES
Physical Therapy    Physical Therapy Follow up    Patient Name: Jacqueline Cam  MRN: [id number]  Today's Date: 7/15/2024  Time Calculation  Start Time: 0845  Stop Time: 0930  Time Calculation (min): 45 min     PT Therapeutic Procedures Time Entry  Therapeutic Exercise Time Entry: 45                  Visit #5  Insurance; Medicare  Cert; 6/25/2024 - 9/20/2024  Assessment  Patient presents with acute right hip/Gluteal pain which started about a week and a half ago while working on hip adductor machine. Patient experiencing constant throbbing ache during day and night, which seem to be improving a little after steroid pack and daily Ibuprofen. Patient unable to initiate normal daily walking routine of 3.5 miles and several times a  week trip to gym for upper/lower body workouts. Patient motivated to heal through therapy and agreeable with recommended plan.   7/15/2024; TRX with modified row-squats tolerated. Guidance in rest between reps guided in order to avoid any further hip pain exacerbation. Patient requires guidance and correction of exercises pace more so than form. Supine lumbar flexion and right SLR not producing familiar Gluteal pain. Addition of ankle weight tolerated.        Plan; patient education, therex, manual therapy. Patient instructed to try to up walking to 20-25 minutes, with slightly increased speed and grade at level 2 (prior baseline pace still higher, prior grade for walking at level 4(       Current Problem  1. Right hip pain        2. Lumbar radiculopathy  Follow Up In Physical Therapy          Subjective   General: Went to the gym over the weekend, and tried using leg press machine. Before I use to press 75 Ibs, so I tried 25 Ibs this time and I did total of 30 reps without problem. Also, went for 2.5 mph on treadmill x 20 minutes, and that was fine.   Precautions: NA     Vital Signs: NA     Pain: 1.5/10 right now, and feeling mostly stiffness and overall better.   Home Living: NA     Prior  Function Per Pt/Caregiver Report: in gym 3- 5 times a week, usually walking 3.5 miles per day        Objective   Posture: WNL     Range of Motion: Right hip flexion 0-110     Strength: MMT of right hip flexor 4/5 - limited by hip pain  Right hip extension, abduction, adduction 5-/5     Flexibility: NA     Palpation: tenderness/soreness with right Gluteal palpation      Special Tests: +with right hip flexion end range at 110, ER at 45     Gait: normal gait cycle     Outcome Measures:  Modified Oswestry = 38%   OP EDUCATION:     PT intervention;  SciFit Bike; x 10 minutes  Isometric Gluteal isometrics - HEP  Bridge 3  x 10, with blue TB around knees  Hook lying hip adduction with ball, and with addition of abdominals contractions with 5 seconds holds x 30  Continued left side lying clams using resistance of green TB  Continued Quadruped Right hip extensions; 3 x 10  Standing right hip flexions, hip abduction, and extension (used 3 Ibs ankle weight)  Added rows with TRX, modified with partial squats  Added side steps with green TB under feet, distance of 10 feet x 2 minutes  Added modified lunges by stairs  Tested repeated lumbar flexion      Access Code: [email or web address]  URL: [email or web address]  Date: 06/25/2024  Prepared by: Cynthia Cassidy    Exercises  - Supine Gluteal Sets  - 1 x daily - 7 x weekly - 3 sets - 10 reps  - Supine Bridge  - 1 x daily - 7 x weekly - 3 sets - 10 reps  - Supine Hip Adduction Isometric with Ball  - 1 x daily - 7 x weekly - 3 sets - 10 reps  - Supine Straight Leg Raises  - 1 x daily - 7 x weekly - 3 sets - 10 reps  Goals:  Active       PT Problem       PT Goal 1       Start:  06/25/24    Expected End:  09/20/24       Patient will reduce difficulties related to functional mobilities and quality of life, as evident by Modified Oswestry score reduction by 50%         PT Goal 2       Start:  06/25/24    Expected End:  09/20/24       Patient will demonstrated improved ROM of   right hip, indicated by goniometric measure equal bilaterally across hips, pain free          PT Goal 3       Start:  06/25/24    Expected End:  09/20/24       Patient will report reduced/abolished pain in right hip, indicated by grade of 0-1/10 on 0-10 pain scale          PT Goal 4       Start:  06/25/24    Expected End:  09/20/24       Patient will demonstrated improved strength of  right hip/leg, evident by MMT of 5/5, and evident by report of ability to resume normal daily walks pain free of with minimal hip discomfort at completion of walks

## 2024-07-19 ENCOUNTER — TREATMENT (OUTPATIENT)
Dept: PHYSICAL THERAPY | Facility: CLINIC | Age: 79
End: 2024-07-19
Payer: MEDICARE

## 2024-07-19 DIAGNOSIS — M25.551 RIGHT HIP PAIN: ICD-10-CM

## 2024-07-19 DIAGNOSIS — M54.16 LUMBAR RADICULOPATHY: Primary | ICD-10-CM

## 2024-07-19 PROCEDURE — 97110 THERAPEUTIC EXERCISES: CPT | Mod: GP

## 2024-07-19 NOTE — PROGRESS NOTES
Physical Therapy    Physical Therapy Follow up    Patient Name: Jacqueline Cam  MRN: [id number]  Today's Date: 7/19/2024  Time Calculation  Start Time: 1030  Stop Time: 1115  Time Calculation (min): 45 min     PT Therapeutic Procedures Time Entry  Therapeutic Exercise Time Entry: 45                  Visit #6  Insurance; Medicare  Cert; 6/25/2024 - 9/20/2024  Assessment  Patient presents with acute right hip/Gluteal pain which started about a week and a half ago while working on hip adductor machine. Patient experiencing constant throbbing ache during day and night, which seem to be improving a little after steroid pack and daily Ibuprofen. Patient unable to initiate normal daily walking routine of 3.5 miles and several times a  week trip to gym for upper/lower body workouts. Patient motivated to heal through therapy and agreeable with recommended plan.   7/15/2024; TRX with modified row-squats tolerated. Guidance in rest between reps guided in order to avoid any further hip pain exacerbation. Patient requires guidance and correction of exercises pace more so than form. Supine lumbar flexion and right SLR not producing familiar Gluteal pain. Addition of ankle weight tolerated.        Plan; patient education, therex, manual therapy. Patient instructed to try to up walking to 20-25 minutes, with slightly increased speed and grade at level 2 (prior baseline pace still higher, prior grade for walking at level 4(       Current Problem  1. Lumbar radiculopathy  Follow Up In Physical Therapy      2. Right hip pain            Subjective   General: Went to the gym over the weekend, and tried using leg press machine. Before I use to press 75 Ibs, so I tried 25 Ibs this time and I did total of 30 reps without problem. Also, went for 2.5 mph on treadmill x 20 minutes, and that was fine.   Precautions: NA     Vital Signs: NA     Pain: 1.5/10 right now, and feeling mostly stiffness and overall better.   Home Living: NA     Prior  Function Per Pt/Caregiver Report: in gym 3- 5 times a week, usually walking 3.5 miles per day        Objective   Posture: WNL     Range of Motion: Right hip flexion 0-110     Strength: MMT of right hip flexor 4/5 - limited by hip pain  Right hip extension, abduction, adduction 5-/5     Flexibility: NA     Palpation: tenderness/soreness with right Gluteal palpation      Special Tests: +with right hip flexion end range at 110, ER at 45     Gait: normal gait cycle     Outcome Measures:  Modified Oswestry = 38%   OP EDUCATION:     PT intervention;  Wall slides against Ball; 2 x 10  Modified squats, picking up ball and rotation left/right  Modified tandem with diagonal trunk rotations while holding blue Ball  Seated forward rolls, rotation to right with hands on ball  Seated forward roll (Quadratus stretch) with rotations to right and left  Review of Trx- Modifed Squads  Review of side steps with green TB under feet  Verbal review of standing hip abductions, flexions and extension  Repeated lumbar flexions from chair  Seated right Piriformis and Gluteal stretches  Attempted and stopped standing lumbar extensions - due to extending ache along posterior and lateral right thigh   Lunges on step - not today     Access Code: [email or web address]  URL: [email or web address]  Date: 06/25/2024  Prepared by: Cynthia Cassidy    Exercises  - Supine Gluteal Sets  - 1 x daily - 7 x weekly - 3 sets - 10 reps  - Supine Bridge  - 1 x daily - 7 x weekly - 3 sets - 10 reps  - Supine Hip Adduction Isometric with Ball  - 1 x daily - 7 x weekly - 3 sets - 10 reps  - Supine Straight Leg Raises  - 1 x daily - 7 x weekly - 3 sets - 10 reps  Goals:  Active       PT Problem       PT Goal 1       Start:  06/25/24    Expected End:  09/20/24       Patient will reduce difficulties related to functional mobilities and quality of life, as evident by Modified Oswestry score reduction by 50%         PT Goal 2       Start:  06/25/24    Expected  End:  09/20/24       Patient will demonstrated improved ROM of  right hip, indicated by goniometric measure equal bilaterally across hips, pain free          PT Goal 3       Start:  06/25/24    Expected End:  09/20/24       Patient will report reduced/abolished pain in right hip, indicated by grade of 0-1/10 on 0-10 pain scale          PT Goal 4       Start:  06/25/24    Expected End:  09/20/24       Patient will demonstrated improved strength of  right hip/leg, evident by MMT of 5/5, and evident by report of ability to resume normal daily walks pain free of with minimal hip discomfort at completion of walks

## 2024-07-22 ENCOUNTER — APPOINTMENT (OUTPATIENT)
Dept: OCCUPATIONAL THERAPY | Facility: HOSPITAL | Age: 79
End: 2024-07-22
Payer: MEDICARE

## 2024-07-23 ENCOUNTER — TREATMENT (OUTPATIENT)
Dept: PHYSICAL THERAPY | Facility: CLINIC | Age: 79
End: 2024-07-23
Payer: MEDICARE

## 2024-07-23 DIAGNOSIS — M25.551 RIGHT HIP PAIN: Primary | ICD-10-CM

## 2024-07-23 DIAGNOSIS — M54.16 LUMBAR RADICULOPATHY: ICD-10-CM

## 2024-07-23 PROCEDURE — 97110 THERAPEUTIC EXERCISES: CPT | Mod: GP,CQ

## 2024-07-23 ASSESSMENT — PAIN - FUNCTIONAL ASSESSMENT: PAIN_FUNCTIONAL_ASSESSMENT: 0-10

## 2024-07-23 ASSESSMENT — PAIN SCALES - GENERAL: PAINLEVEL_OUTOF10: 2

## 2024-07-23 NOTE — PROGRESS NOTES
Physical Therapy    Physical Therapy Follow up    Patient Name: Jacqueline Cam  MRN: [id number]  Today's Date: 7/23/2024  Time Calculation  Start Time: 0800  Stop Time: 0845  Time Calculation (min): 45 min     PT Therapeutic Procedures Time Entry  Therapeutic Exercise Time Entry: 45                  Visit #7  Insurance; Medicare  Cert; 6/25/2024 - 9/20/2024    Assessment  Evaluation/Treatment Tolerance: Patient tolerated treatment well  Therapy session received well. Two moments of increased discomfort in the right hip reported during therapy ball wall squats and when in single leg stance on the right during three way hip with resistance. Moderate tightness expressed through right hamstring. Addition of core strengthening today. Finishes treatment feeling well at this time.     Plan; patient education, therex, manual therapy. Patient instructed to try to up walking to 20-25 minutes, with slightly increased speed and grade at level 2 (prior baseline pace still higher, prior grade for walking at level 4     Current Problem  1. Right hip pain        2. Lumbar radiculopathy  Follow Up In Physical Therapy      Subjective   No back pain right now, just stiff  Pain in the right hip around a dull 2/10, doesn't seem to go away, always aches  HEP completed twice a day  Walked for around 30 minutes yesterday without increase of symptoms    Precautions: NA     Vital Signs: NA     Pain: 1.5/10 right now, and feeling mostly stiffness and overall better.   Home Living: NA     Prior Function Per Pt/Caregiver Report: in gym 3- 5 times a week, usually walking 3.5 miles per day      Objective   Posture: WNL     Range of Motion: Right hip flexion 0-110     Strength: MMT of right hip flexor 4/5 - limited by hip pain  Right hip extension, abduction, adduction 5-/5     Flexibility: NA     Palpation: tenderness/soreness with right Gluteal palpation      Special Tests: +with right hip flexion end range at 110, ER at 45     Gait: normal gait  cycle     Outcome Measures:  Modified Oswestry = 38%   OP EDUCATION:     PT intervention;  Seated hamstring stretching 3 x 20 sec  Seated right Piriformis and Gluteal stretches 2 x 20 sec  Repeated lumbar flexions from chair x 15  Seated forward rolls on therapy ball forward/left/right x 15 each  side steps with green TB under feet RTB white tape x 2  standing hip abductions, flexions and extension RTB x 15 each direction  Modified squats, picking up ball and rotation left/right x 15  Modified tandem with diagonal trunk rotations while holding blue Ball x 15  Wall slides against Ball; 2 x 10 Ta holding  Hip adduction with TA holding 20 x 5 sec each  Ta holds with SLR x 15 each    Access Code: [email or web address]  URL: [email or web address]  Date: 06/25/2024  Prepared by: Cynthia Cassidy    Exercises  - Supine Gluteal Sets  - 1 x daily - 7 x weekly - 3 sets - 10 reps  - Supine Bridge  - 1 x daily - 7 x weekly - 3 sets - 10 reps  - Supine Hip Adduction Isometric with Ball  - 1 x daily - 7 x weekly - 3 sets - 10 reps  - Supine Straight Leg Raises  - 1 x daily - 7 x weekly - 3 sets - 10 reps  Goals:  Active       PT Problem       PT Goal 1       Start:  06/25/24    Expected End:  09/20/24       Patient will reduce difficulties related to functional mobilities and quality of life, as evident by Modified Oswestry score reduction by 50%         PT Goal 2       Start:  06/25/24    Expected End:  09/20/24       Patient will demonstrated improved ROM of  right hip, indicated by goniometric measure equal bilaterally across hips, pain free          PT Goal 3       Start:  06/25/24    Expected End:  09/20/24       Patient will report reduced/abolished pain in right hip, indicated by grade of 0-1/10 on 0-10 pain scale          PT Goal 4       Start:  06/25/24    Expected End:  09/20/24       Patient will demonstrated improved strength of  right hip/leg, evident by MMT of 5/5, and evident by report of ability to resume  normal daily walks pain free of with minimal hip discomfort at completion of walks

## 2024-07-25 ENCOUNTER — TREATMENT (OUTPATIENT)
Dept: PHYSICAL THERAPY | Facility: CLINIC | Age: 79
End: 2024-07-25
Payer: MEDICARE

## 2024-07-25 DIAGNOSIS — M54.16 LUMBAR RADICULOPATHY: ICD-10-CM

## 2024-07-25 DIAGNOSIS — M25.551 RIGHT HIP PAIN: Primary | ICD-10-CM

## 2024-07-25 PROCEDURE — 97110 THERAPEUTIC EXERCISES: CPT | Mod: GP,CQ

## 2024-07-25 ASSESSMENT — PAIN - FUNCTIONAL ASSESSMENT: PAIN_FUNCTIONAL_ASSESSMENT: 0-10

## 2024-07-25 ASSESSMENT — PAIN SCALES - GENERAL: PAINLEVEL_OUTOF10: 0 - NO PAIN

## 2024-07-25 NOTE — PROGRESS NOTES
Physical Therapy    Physical Therapy Follow up    Patient Name: Jacqueline Cam  MRN: [id number]  Today's Date: 7/25/2024  Time Calculation  Start Time: 0845  Stop Time: 0930  Time Calculation (min): 45 min     PT Therapeutic Procedures Time Entry  Therapeutic Exercise Time Entry: 45                  Visit #8  Insurance; Medicare  Cert; 6/25/2024 - 9/20/2024    Assessment  Evaluation/Treatment Tolerance: Patient tolerated treatment well  Slight right hip ache felt with modified tandem stance with rotations. No pain but moderate fatigue with resisted lateral walking. Introduced squats in // bars today with MB weight, demonstrates good form with minor changes needed. SLR strength of motion has improved. Finishes session doing well, current pain level through low back/hip is 0/10.     Plan; patient education, therex, manual therapy. Patient instructed to try to up walking to 20-25 minutes, with slightly increased speed and grade at level 2 (prior baseline pace still higher, prior grade for walking at level 4     Current Problem  1. Right hip pain        2. Lumbar radiculopathy  Follow Up In Physical Therapy      Subjective   I felt good after last session  Today I am not having any pain  Past couple weeks I've had a catching sensation in my right hip/knee  HEP is completed daily  No other updates    Precautions: NA     Vital Signs: NA     Pain: 1.5/10 right now, and feeling mostly stiffness and overall better.   Home Living: NA     Prior Function Per Pt/Caregiver Report: in gym 3- 5 times a week, usually walking 3.5 miles per day      Objective   Posture: WNL     Range of Motion: Right hip flexion 0-110     Strength: MMT of right hip flexor 4/5 - limited by hip pain  Right hip extension, abduction, adduction 5-/5     Flexibility: NA     Palpation: tenderness/soreness with right Gluteal palpation      Special Tests: +with right hip flexion end range at 110, ER at 45     Gait: normal gait cycle     Outcome  Measures:  Modified Oswestry = 38%   OP EDUCATION:     PT intervention;  Stepper x 8 min  Seated hamstring stretching 3 x 20 sec  Seated right Piriformis and Gluteal stretches 2 x 20 sec  Lunge stretching on steps x 10 each   Hamstring stretching on 2nd step 3 x 20 sec each  Repeated lumbar flexions from chair x 15  Seated forward rolls on therapy ball forward/left/right x 15 each  Modified tandem with diagonal trunk rotations while holding blue Ball x 15  side steps with green TB under feet RTB white tape x 2  // bar march + hip ER d/b x 3  Medium MB squats in // bars x 20  standing hip abductions, flexions and extension RTB x 15 each direction    Hip adduction with TA holding 20 x 5 sec each  Ta holds with SLR x 15 each    Access Code: [email or web address]  URL: [email or web address]  Date: 06/25/2024  Prepared by: Cynthia Cassidy    Exercises  - Supine Gluteal Sets  - 1 x daily - 7 x weekly - 3 sets - 10 reps  - Supine Bridge  - 1 x daily - 7 x weekly - 3 sets - 10 reps  - Supine Hip Adduction Isometric with Ball  - 1 x daily - 7 x weekly - 3 sets - 10 reps  - Supine Straight Leg Raises  - 1 x daily - 7 x weekly - 3 sets - 10 reps  Goals:  Active       PT Problem       PT Goal 1       Start:  06/25/24    Expected End:  09/20/24       Patient will reduce difficulties related to functional mobilities and quality of life, as evident by Modified Oswestry score reduction by 50%         PT Goal 2       Start:  06/25/24    Expected End:  09/20/24       Patient will demonstrated improved ROM of  right hip, indicated by goniometric measure equal bilaterally across hips, pain free          PT Goal 3       Start:  06/25/24    Expected End:  09/20/24       Patient will report reduced/abolished pain in right hip, indicated by grade of 0-1/10 on 0-10 pain scale          PT Goal 4       Start:  06/25/24    Expected End:  09/20/24       Patient will demonstrated improved strength of  right hip/leg, evident by MMT of  5/5, and evident by report of ability to resume normal daily walks pain free of with minimal hip discomfort at completion of walks

## 2024-07-30 ENCOUNTER — TREATMENT (OUTPATIENT)
Dept: PHYSICAL THERAPY | Facility: CLINIC | Age: 79
End: 2024-07-30
Payer: MEDICARE

## 2024-07-30 DIAGNOSIS — M25.551 RIGHT HIP PAIN: Primary | ICD-10-CM

## 2024-07-30 DIAGNOSIS — M54.16 LUMBAR RADICULOPATHY: ICD-10-CM

## 2024-07-30 PROCEDURE — 97110 THERAPEUTIC EXERCISES: CPT | Mod: GP

## 2024-07-30 NOTE — PROGRESS NOTES
Physical Therapy    Physical Therapy Follow up    Patient Name: Jacqueline Cam  MRN: [id number]  Today's Date: 7/30/2024  Time Calculation  Start Time: 0800  Stop Time: 0845  Time Calculation (min): 45 min     PT Therapeutic Procedures Time Entry  Therapeutic Exercise Time Entry: 45                  Visit #9  Insurance; Medicare  Cert; 6/25/2024 - 9/20/2024    Assessment     Slight right hip ache felt with modified tandem stance with rotations. No pain but moderate fatigue with resisted lateral walking. Introduced squats in // bars today with MB weight, demonstrates good form with minor changes needed. SLR strength of motion has improved. Finishes session doing well, current pain level through low back/hip is 0/10.     Plan; patient education, therex, manual therapy. Patient instructed to try to up walking to 20-25 minutes, with slightly increased speed and grade at level 2 (prior baseline pace still higher, prior grade for walking at level 4     Current Problem  1. Right hip pain        2. Lumbar radiculopathy  Follow Up In Physical Therapy      Subjective   Up to 30 minutes of walking, at 3.0, but still not able to do incline. HEP is completed daily. No other updates    Precautions: NA     Vital Signs: NA     Pain: 1.5/10 right now, and feeling mostly stiffness and overall better.   Home Living: NA     Prior Function Per Pt/Caregiver Report: in gym 3- 5 times a week, usually walking 3.5 miles per day      Objective   Posture: WNL     Range of Motion: Right hip flexion 0-110     Strength: MMT of right hip flexor 4/5 - limited by hip pain  Right hip extension, abduction, adduction 5-/5     Flexibility: NA     Palpation: tenderness/soreness with right Gluteal palpation      Special Tests: +with right hip flexion end range at 110, ER at 45     Gait: normal gait cycle     Outcome Measures:  Modified Oswestry = 38%   OP EDUCATION:     PT intervention;  Stepper x 8 min  Seated hamstring stretching 3 x 20 sec  Seated  right Piriformis and Gluteal stretches 2 x 20 sec  Lunge stretching on steps x 10 each   Hamstring stretching on 2nd step 3 x 20 sec each  Repeated lumbar flexions from chair x 15  Seated forward rolls on therapy ball forward/left/right x 15 each  Modified tandem with diagonal trunk rotations while holding blue Ball x 15  side steps with green TB under feet RTB white tape x 2  // bar march + hip ER d/b x 3  Medium MB squats in // bars x 20  Used Green TB for standing hip flexions and extensions and side steps  Added Trx with reverse lunges  Reviewed core exercises, including alternating leg extensions with abdominal sets from back     Hip adduction with TA holding 20 x 5 sec each  Ta holds with SLR x 15 each    Access Code: [email or web address]  URL: [email or web address]  Date: 06/25/2024  Prepared by: Cynthia Cassidy    Exercises  - Supine Gluteal Sets  - 1 x daily - 7 x weekly - 3 sets - 10 reps  - Supine Bridge  - 1 x daily - 7 x weekly - 3 sets - 10 reps  - Supine Hip Adduction Isometric with Ball  - 1 x daily - 7 x weekly - 3 sets - 10 reps  - Supine Straight Leg Raises  - 1 x daily - 7 x weekly - 3 sets - 10 reps  Goals:  Active       PT Problem       PT Goal 1       Start:  06/25/24    Expected End:  09/20/24       Patient will reduce difficulties related to functional mobilities and quality of life, as evident by Modified Oswestry score reduction by 50%         PT Goal 2       Start:  06/25/24    Expected End:  09/20/24       Patient will demonstrated improved ROM of  right hip, indicated by goniometric measure equal bilaterally across hips, pain free          PT Goal 3       Start:  06/25/24    Expected End:  09/20/24       Patient will report reduced/abolished pain in right hip, indicated by grade of 0-1/10 on 0-10 pain scale          PT Goal 4       Start:  06/25/24    Expected End:  09/20/24       Patient will demonstrated improved strength of  right hip/leg, evident by MMT of 5/5, and  evident by report of ability to resume normal daily walks pain free of with minimal hip discomfort at completion of walks

## 2024-08-01 ENCOUNTER — TREATMENT (OUTPATIENT)
Dept: PHYSICAL THERAPY | Facility: CLINIC | Age: 79
End: 2024-08-01
Payer: MEDICARE

## 2024-08-01 DIAGNOSIS — M54.16 LUMBAR RADICULOPATHY: ICD-10-CM

## 2024-08-01 DIAGNOSIS — M25.551 RIGHT HIP PAIN: Primary | ICD-10-CM

## 2024-08-01 PROCEDURE — 97110 THERAPEUTIC EXERCISES: CPT | Mod: GP,CQ

## 2024-08-01 ASSESSMENT — PAIN SCALES - GENERAL: PAINLEVEL_OUTOF10: 0 - NO PAIN

## 2024-08-01 ASSESSMENT — PAIN - FUNCTIONAL ASSESSMENT: PAIN_FUNCTIONAL_ASSESSMENT: 0-10

## 2024-08-01 NOTE — PROGRESS NOTES
Physical Therapy    Physical Therapy Follow up    Patient Name: Jacqueline Cam  MRN: [id number]  Today's Date: 8/1/2024  Time Calculation  Start Time: 0845  Stop Time: 0930  Time Calculation (min): 45 min     PT Therapeutic Procedures Time Entry  Therapeutic Exercise Time Entry: 45                 Visit #10  Insurance; Medicare  Cert; 6/25/2024 - 9/20/2024    Assessment  Evaluation/Treatment Tolerance: Patient tolerated treatment well  Positive response to treatment. Additional core training added this date, reports these exercises are the most challenging for the patient. Remaining therex completed without reports of increased discomfort. Finishes session with improved symptoms in the right hip.     Plan; patient education, therex, manual therapy. Patient instructed to try to up walking to 20-25 minutes, with slightly increased speed and grade at level 2 (prior baseline pace still higher, prior grade for walking at level 4     Current Problem  1. Right hip pain        2. Lumbar radiculopathy          Subjective   No pain, stiffness; I think it will rain  Occasional twinge through right low back/glute  Sometimes I feel as if something is pinching in my right hip  HEP completed daily along with gym visits    Precautions: NA     Vital Signs: NA     Pain: 1.5/10 right now, and feeling mostly stiffness and overall better.   Home Living: NA     Prior Function Per Pt/Caregiver Report: in gym 3- 5 times a week, usually walking 3.5 miles per day      Objective   Posture: WNL     Range of Motion: Right hip flexion 0-110     Strength: MMT of right hip flexor 4/5 - limited by hip pain  Right hip extension, abduction, adduction 5-/5     Flexibility: NA     Palpation: tenderness/soreness with right Gluteal palpation      Special Tests: +with right hip flexion end range at 110, ER at 45     Gait: normal gait cycle     Outcome Measures:  Modified Oswestry = 38%   OP EDUCATION:     PT intervention;  Stepper x 8 min lvl 2.3  Lunge  stretching on steps x 10 each   Gentle long axis traction through right leg x 5  90/90 hip mobility x 20  Hip adduction with TA holding 20 x 5 sec each  Ta holds with SLR x 15 each  GTB hip march with TA contraction x 20  RTB resisted trunk rotations x 20 each side  Blue tb rowing with TA hold/mini squat x 20  Side lying straight leg abduction x 15 each side  Medium MB squats in // bars x 20  Seated right Piriformis and Gluteal stretches 2 x 20 sec  Repeated lumbar flexions from chair x 15  Seated forward rolls on therapy ball left/right x 15 each  Added Trx with reverse lunges x 15    Access Code: [email or web address]  URL: [email or web address]  Date: 06/25/2024  Prepared by: Cynthia Cassidy    Exercises  - Supine Gluteal Sets  - 1 x daily - 7 x weekly - 3 sets - 10 reps  - Supine Bridge  - 1 x daily - 7 x weekly - 3 sets - 10 reps  - Supine Hip Adduction Isometric with Ball  - 1 x daily - 7 x weekly - 3 sets - 10 reps  - Supine Straight Leg Raises  - 1 x daily - 7 x weekly - 3 sets - 10 reps    Goals:  Active       PT Problem       PT Goal 1       Start:  06/25/24    Expected End:  09/20/24       Patient will reduce difficulties related to functional mobilities and quality of life, as evident by Modified Oswestry score reduction by 50%         PT Goal 2       Start:  06/25/24    Expected End:  09/20/24       Patient will demonstrated improved ROM of  right hip, indicated by goniometric measure equal bilaterally across hips, pain free          PT Goal 3       Start:  06/25/24    Expected End:  09/20/24       Patient will report reduced/abolished pain in right hip, indicated by grade of 0-1/10 on 0-10 pain scale          PT Goal 4       Start:  06/25/24    Expected End:  09/20/24       Patient will demonstrated improved strength of  right hip/leg, evident by MMT of 5/5, and evident by report of ability to resume normal daily walks pain free of with minimal hip discomfort at completion of walks

## 2024-08-06 ENCOUNTER — TREATMENT (OUTPATIENT)
Dept: PHYSICAL THERAPY | Facility: CLINIC | Age: 79
End: 2024-08-06
Payer: MEDICARE

## 2024-08-06 ENCOUNTER — DOCUMENTATION (OUTPATIENT)
Dept: PHYSICAL THERAPY | Facility: CLINIC | Age: 79
End: 2024-08-06

## 2024-08-06 DIAGNOSIS — M54.16 LUMBAR RADICULOPATHY: Primary | ICD-10-CM

## 2024-08-06 DIAGNOSIS — M25.551 RIGHT HIP PAIN: ICD-10-CM

## 2024-08-06 PROCEDURE — 97110 THERAPEUTIC EXERCISES: CPT | Mod: GP

## 2024-08-06 NOTE — PROGRESS NOTES
Physical Therapy    Physical Therapy Follow up    Patient Name: Jacqueline Cam  MRN: [id number]  Today's Date: 8/6/2024  Time Calculation  Start Time: 0800  Stop Time: 0845  Time Calculation (min): 45 min     PT Therapeutic Procedures Time Entry  Therapeutic Exercise Time Entry: 45                 Visit #11  Insurance; Medicare  Cert; 6/25/2024 - 9/20/2024    Assessment     Positive response to treatment. Additional core training added this date, reports these exercises are the most challenging for the patient. Remaining therex completed without reports of increased discomfort. Finishes session with improved symptoms in the right hip.   8/6/2024: Patient demo good posture, functional ROM through lumbar spine, good right hip Flexors and Gluteals strength, good gait and solid understanding of HEP. Patient ready for DC    Plan; patient education, therex, manual therapy. Patient instructed to try to up walking to 20-25 minutes, with slightly increased speed and grade at level 2 (prior baseline pace still higher, prior grade for walking at level 4     Current Problem  1. Lumbar radiculopathy        2. Right hip pain          Subjective   I may be over doing it a bit with exercises. Walked for 30 minutes on Sunday and started to feel significant lower back pain. With weather changes I feel my back more it seems.   Precautions: NA     Vital Signs: NA     Pain: 3/10 - right buttock and lower back  Home Living: NA     Prior Function Per Pt/Caregiver Report: in gym 3- 5 times a week, usually walking 3.5 miles per day      Objective   Posture: WNL     Range of Motion: Right hip flexion 0-110     Strength: MMT of right hip flexor 4/5 - limited by hip pain  Right hip extension, abduction, adduction 5-/5     Flexibility: NA     Palpation: tenderness/soreness with right Gluteal palpation      Special Tests: Right hip flexion at end range (120 degrees) without pain exacerbation      Gait: normal gait cycle     Outcome  Measures:  Modified Oswestry = 12%  OP EDUCATION:     PT intervention;  Stepper x 8 min lvl 2.3  Lunge stretching on steps x 10 each   Gentle long axis traction through right leg x 5  90/90 hip mobility x 20  Hip adduction with TA holding 20 x 5 sec each  Ta holds with SLR x 15 each  GTB hip march with TA contraction x 20  RTB resisted trunk rotations x 20 each side and side step Abs  Blue tb rowing with TA hold/mini squat x 20  Side lying straight leg abduction x 15 each side  Medium MB squats in // bars x 20  Seated right Piriformis and Gluteal stretches 2 x 20 sec  Repeated lumbar flexions from chair x 15  Repeated lumbar extensions from standing, ADRI x 10 reps  Seated forward rolls on therapy ball left/right x 15 each  Added Trx with reverse lunges x 15    Access Code: [email or web address]  URL: [email or web address]  Date: 06/25/2024  Prepared by: Cynthia Cassidy    Exercises  - Supine Gluteal Sets  - 1 x daily - 7 x weekly - 3 sets - 10 reps  - Supine Bridge  - 1 x daily - 7 x weekly - 3 sets - 10 reps  - Supine Hip Adduction Isometric with Ball  - 1 x daily - 7 x weekly - 3 sets - 10 reps  - Supine Straight Leg Raises  - 1 x daily - 7 x weekly - 3 sets - 10 reps    Goals:  Active       PT Problem       PT Goal 1       Start:  06/25/24    Expected End:  09/20/24       Patient will reduce difficulties related to functional mobilities and quality of life, as evident by Modified Oswestry score reduction by 50%         PT Goal 2       Start:  06/25/24    Expected End:  09/20/24       Patient will demonstrated improved ROM of  right hip, indicated by goniometric measure equal bilaterally across hips, pain free          PT Goal 3       Start:  06/25/24    Expected End:  09/20/24       Patient will report reduced/abolished pain in right hip, indicated by grade of 0-1/10 on 0-10 pain scale          PT Goal 4       Start:  06/25/24    Expected End:  09/20/24       Patient will demonstrated improved strength  of  right hip/leg, evident by MMT of 5/5, and evident by report of ability to resume normal daily walks pain free of with minimal hip discomfort at completion of walks

## 2024-08-06 NOTE — PROGRESS NOTES
Physical Therapy    Discharge Summary    Name: Jacqueline Cam  MRN: [id number]  : 1945  Date: 24    Discharge Summary: PT    Discharge Information: Date of discharge 2024, Date of last visit 2024, Date of evaluation 2024, Number of attended visits 11, Referred by Dr Julian BERGER, and Referred for right lumbar radiculopathy     Therapy Summary: Patient instructed in full lumbar ROM recovery exercises, full hip AROM exercises, conditioning exercises for core and Gluteals, stretching    Discharge Status: improved     Rehab Discharge Reason: Achieved all and/or the most significant goals(s)

## 2024-08-15 NOTE — PROGRESS NOTES
Subjective   Patient ID: Jacqueline Cam is a 79 y.o. female who presents for rash on chest    HPI     Review of Systems    Objective   There were no vitals taken for this visit.    Physical Exam    Assessment/Plan   {Assess/PlanSmartLinks:[id number]}

## 2024-08-16 ENCOUNTER — APPOINTMENT (OUTPATIENT)
Dept: PRIMARY CARE | Facility: CLINIC | Age: 79
End: 2024-08-16
Payer: MEDICARE

## 2024-09-05 ENCOUNTER — APPOINTMENT (OUTPATIENT)
Dept: ORTHOPEDIC SURGERY | Facility: CLINIC | Age: 79
End: 2024-09-05
Payer: MEDICARE

## 2024-09-05 DIAGNOSIS — M54.16 LUMBAR RADICULOPATHY: Primary | ICD-10-CM

## 2024-09-05 PROCEDURE — 99213 OFFICE O/P EST LOW 20 MIN: CPT | Performed by: STUDENT IN AN ORGANIZED HEALTH CARE EDUCATION/TRAINING PROGRAM

## 2024-09-05 NOTE — PROGRESS NOTES
REFERRAL SOURCE: No ref. provider found     CHIEF COMPLAINT: right hip pain    HISTORY OF PRESENT ILLNESS  Jacqueline Cam is a very pleasant 79 y.o. female with history of hypothyroidism, osteoporosis who is here for evaluation of right hip pain.     6/24/24: She complains of right buttock pain that started when she was at the gym on 6/13/2024.  She thinks that it started after doing the adductor machine, but cannot remember a specific injury.  There was no trauma or fall.  She has been walking 3-5 miles a day, but stopped due to this pain.  She describes the pain as burning and aching in the buttock region.  It does not radiate down the leg.  She has a history of low back problems and does have some low back pain as well.  No pain in the groin.  She was given a Medrol Dosepak by her primary care physician that did help with the pain and she completed it yesterday.  She also has pain at night, particularly when lying flat that improves when flexing the knee.    Interval History:  9/5/2024: She says she is 99.7% better compared to last visit. She was going to physical therapy twice a week and was cleared a couple of weeks ago. She has been doing her home exercise program daily and will often do it at the gym. She is doing machine exercises like a seated chest press, leg press, and ab exercises without any pain. She is almost back to walking her normal 3-5 miles a day, and she does not have any pain with walking. She is not needing any pain medications.    MEDS    Current Outpatient Medications:     b complex-vitamin c (multivitamin, stress formula) tablet, Take 1 tablet by mouth once daily., Disp: , Rfl:     biotin 1 mg tablet, Take 1 tablet (1 mg) by mouth once daily., Disp: , Rfl:     cholecalciferol (Vitamin D-3) 25 MCG (1000 UT) tablet, Take 1 tablet (25 mcg) by mouth once daily., Disp: , Rfl:     L. acidophilus/Bifid. animalis 32 billion cell capsule, Take 1 capsule by mouth 2 times a day., Disp: , Rfl:      levothyroxine (Synthroid, Levoxyl) 88 mcg tablet, TAKE 1 TABLET BY MOUTH EVERY DAY, Disp: 90 tablet, Rfl: 1    polyethylene glycol (Miralax) 17 gram/dose powder, Take 17 g by mouth 1 time., Disp: , Rfl:     ALLERGIES  Allergies   Allergen Reactions    Benadryl [Diphenhydramine Hcl] Unknown    House Dust Unknown    Iodine Unknown    Neosporin Anti-Itch [Hydrocortisone] Rash       PAST MEDICAL HISTORY  Past Medical History:   Diagnosis Date    Irritable bowel syndrome with constipation 11/14/2022    Irritable bowel syndrome with constipation    Personal history of other diseases of the respiratory system 12/10/2017    History of asthma    Personal history of other endocrine, nutritional and metabolic disease 12/11/2017    History of hypothyroidism    Personal history of other malignant neoplasm of skin     Personal history of malignant neoplasm of skin    Unspecified sprain of right foot, initial encounter 01/12/2017    Sprain of right foot, initial encounter       PAST SURGICAL HISTORY  Past Surgical History:   Procedure Laterality Date    APPENDECTOMY      CATARACT EXTRACTION  08/28/2014    Cataract Surgery    TONSILLECTOMY  08/28/2014    Tonsillectomy With Adenoidectomy    TUBAL LIGATION         SOCIAL HISTORY   Social History     Socioeconomic History    Marital status: Single     Spouse name: Not on file    Number of children: Not on file    Years of education: Not on file    Highest education level: Not on file   Occupational History    Not on file   Tobacco Use    Smoking status: Former     Types: Cigarettes    Smokeless tobacco: Never   Substance and Sexual Activity    Alcohol use: Not Currently    Drug use: Not on file    Sexual activity: Not on file   Other Topics Concern    Not on file   Social History Narrative    Not on file     Social Determinants of Health     Financial Resource Strain: Not on file   Food Insecurity: Not on file   Transportation Needs: Not on file   Physical Activity: Not on file    Stress: Not on file   Social Connections: Not on file   Intimate Partner Violence: Not on file   Housing Stability: Not on file       FAMILY HISTORY  Family History   Problem Relation Name Age of Onset    Cancer Other      Hypertension Other      Lung disease Other      Hearing loss Other      Allergies Other         REVIEW OF SYSTEMS  Except for those mentioned in the history of present illness, and below, a complete review of systems is negative.     VITALS  There were no vitals filed for this visit.    PHYSICAL EXAMINATION   GENERAL:  Awake, alert, and oriented, no apparent distress, pleasant, and cooperative  PSYC: Mood is euthymic, affect is congruent  EAR, NOSE, THROAT:  Normocephalic, atraumatic, moist membranes, anicteric sclera  LUNG: Nonlabored breathing  HEART: No clubbing or cyanosis  SKIN: No increased erythema, warmth, rashes, or concerning skin lesions  NEURO: Sensation is intact in the bilateral lower extremities. Strength is grossly 5 out of 5 throughout the bilateral lower extremities, unless noted below.  Positive straight leg raise.  GAIT: Non-antalgic  MUSCULOSKELETAL: Examination of the right hip: Hip range of motion was full and pain-free. Scour's and FAIR were negative. Stinchfield was negative. JARON was negative. Ganeslen's and P4 are negative.  Tenderness to palpation over the right gluteus medius. No tenderness to palpation over the greater trochanteric region, ASIS, AIIS, adductor tendons, ischial tuberosity. Jase's was negative.    IMAGING STUDIES:   Radiographs of the right hip dated 6/17/2024 were personally reviewed and interpreted by Dr. Tonie Jordan, and the findings shared with the patient.  No significant joint space narrowing.  No identified fracture.  Right hip chondrocalcinosis.  Degenerative disc disease at L5-S1.    Radiographs of the lumbar spine dated 6/24/2024 were personally reviewed and interpreted by Dr. Tonie Jordan, and the findings shared with the  patient. L1 compression fracture.     IMPRESSION  #1 right lumbar radiculopathy    PLAN  The following was discussed with the patient:     Jacqueline Cam is a very pleasant 79 y.o. female with history of hypothyroidism, osteoporosis who is here for follow-up evaluation of right hip pain thought to be likely due to right lumbar radiculopathy. Her pain is nearly completely resolved after physical therapy and HEP, and she is back to walking her normal distances.  -We discussed the diagnosis of lumbar radiculopathy.  -Lumbar spine x-rays demonstrated an L1 compression fracture, but this does not appear to be consistent with her pain  -She has had near resolution of her pain with physical therapy, and we discussed continuing her home exercise program moving forward.  -Follow-up as needed.    The patient was counseled to remain active, but avoid activities that worsen symptoms. The patient was in agreement with this plan. All questions were answered to the best of my ability.    PATIENT EDUCATION:  Education was discussed at today's appointment. A learning needs assessment was performed.    Primary learner: Jacqueline Cam  Barriers to learning: None  Preferred language: English  Learning preferences include: Seeing and doing.  Discussed: Diagnosis and treatment plan.  Demonstrated: Understanding of material discussed.  Patient education materials given: None.  Learner response: Learner demonstrated understanding.    This note was dictated using Dragon speech recognition software and was not corrected for spelling or grammatical errors.    Erick Carranza MD, MEd  Primary Care Sports Medicine Fellow, PGY-4  [facility]

## 2024-09-05 NOTE — PROGRESS NOTES
REFERRAL SOURCE: No ref. provider found     CHIEF COMPLAINT: right hip pain    HISTORY OF PRESENT ILLNESS  Jacqueline Cam is a very pleasant 79 y.o. female with history of hypothyroidism, osteoporosis who is here for evaluation of right hip pain.     Previous History:  6/24/24: She complains of right buttock pain that started when she was at the gym on 6/13/2024.  She thinks that it started after doing the adductor machine, but cannot remember a specific injury.  There was no trauma or fall.  She has been walking 3-5 miles a day, but stopped due to this pain.  She describes the pain as burning and aching in the buttock region.  It does not radiate down the leg.  She has a history of low back problems and does have some low back pain as well.  No pain in the groin.  She was given a Medrol Dosepak by her primary care physician that did help with the pain and she completed it yesterday.  She also has pain at night, particularly when lying flat that improves when flexing the knee.    Interval History:  9/5/24:     MEDS    Current Outpatient Medications:     b complex-vitamin c (multivitamin, stress formula) tablet, Take 1 tablet by mouth once daily., Disp: , Rfl:     biotin 1 mg tablet, Take 1 tablet (1 mg) by mouth once daily., Disp: , Rfl:     cholecalciferol (Vitamin D-3) 25 MCG (1000 UT) tablet, Take 1 tablet (25 mcg) by mouth once daily., Disp: , Rfl:     L. acidophilus/Bifid. animalis 32 billion cell capsule, Take 1 capsule by mouth 2 times a day., Disp: , Rfl:     levothyroxine (Synthroid, Levoxyl) 88 mcg tablet, TAKE 1 TABLET BY MOUTH EVERY DAY, Disp: 90 tablet, Rfl: 1    polyethylene glycol (Miralax) 17 gram/dose powder, Take 17 g by mouth 1 time., Disp: , Rfl:     ALLERGIES  Allergies   Allergen Reactions    Benadryl [Diphenhydramine Hcl] Unknown    House Dust Unknown    Iodine Unknown    Neosporin Anti-Itch [Hydrocortisone] Rash       PAST MEDICAL HISTORY  Past Medical History:   Diagnosis Date    Irritable  bowel syndrome with constipation 11/14/2022    Irritable bowel syndrome with constipation    Personal history of other diseases of the respiratory system 12/10/2017    History of asthma    Personal history of other endocrine, nutritional and metabolic disease 12/11/2017    History of hypothyroidism    Personal history of other malignant neoplasm of skin     Personal history of malignant neoplasm of skin    Unspecified sprain of right foot, initial encounter 01/12/2017    Sprain of right foot, initial encounter       PAST SURGICAL HISTORY  Past Surgical History:   Procedure Laterality Date    APPENDECTOMY      CATARACT EXTRACTION  08/28/2014    Cataract Surgery    TONSILLECTOMY  08/28/2014    Tonsillectomy With Adenoidectomy    TUBAL LIGATION         SOCIAL HISTORY   Social History     Socioeconomic History    Marital status: Single     Spouse name: Not on file    Number of children: Not on file    Years of education: Not on file    Highest education level: Not on file   Occupational History    Not on file   Tobacco Use    Smoking status: Former     Types: Cigarettes    Smokeless tobacco: Never   Substance and Sexual Activity    Alcohol use: Not Currently    Drug use: Not on file    Sexual activity: Not on file   Other Topics Concern    Not on file   Social History Narrative    Not on file     Social Determinants of Health     Financial Resource Strain: Not on file   Food Insecurity: Not on file   Transportation Needs: Not on file   Physical Activity: Not on file   Stress: Not on file   Social Connections: Not on file   Intimate Partner Violence: Not on file   Housing Stability: Not on file       FAMILY HISTORY  Family History   Problem Relation Name Age of Onset    Cancer Other      Hypertension Other      Lung disease Other      Hearing loss Other      Allergies Other         REVIEW OF SYSTEMS  Except for those mentioned in the history of present illness, and below, a complete review of systems is negative.      Review of Systems    VITALS  There were no vitals filed for this visit.    PHYSICAL EXAMINATION   GENERAL:  Awake, alert, and oriented, no apparent distress, pleasant, and cooperative  PSYC: Mood is euthymic, affect is congruent  EAR, NOSE, THROAT:  Normocephalic, atraumatic, moist membranes, anicteric sclera  LUNG: Nonlabored breathing  HEART: No clubbing or cyanosis  SKIN: No increased erythema, warmth, rashes, or concerning skin lesions  NEURO: Sensation is intact in the bilateral lower extremities. Strength is grossly 5 out of 5 throughout the bilateral lower extremities, unless noted below.  Positive straight leg raise.  GAIT: Non-antalgic  MUSCULOSKELETAL: Examination of the right hip: Hip range of motion was full and pain-free. Scour's and FAIR were negative. Stinchfield reproduced her typical buttock pain. Baudilio's was negative. Ganeslen's and P4 are negative.  Tender palpation of the piriformis and sacrum.  No tenderness to palpation over the greater trochanteric region, ASIS, AIIS, adductor tendons, ischial tuberosity. Jase's was negative.    IMAGING STUDIES:   Radiographs of the right hip dated 6/17/2024 -  Right hip chondrocalcinosis.  Degenerative disc disease at L5-S1.     IMPRESSION  #1 right lumbar radiculopathy    PLAN  The following was discussed with the patient:     Jacqueline Cam is a very pleasant 79 y.o. female with history of hypothyroidism, osteoporosis who is here for evaluation of right hip pain likely due to right lumbar radiculopathy given the positive straight leg raise.  Given how much she is walking, it is not impossible that she could have a sacral bone stress injury contributing to her buttock pain, though this is unlikely to occur after doing a seated exercise at the gym.  -We discussed the diagnosis of lumbar radiculopathy.  -We will obtain radiographs of the lumbar spine.  -We discussed that physical therapy is the mainstay of treatment and she was given a referral  today.  -We discussed the role of meloxicam given that she has had some GI side effects with ibuprofen in the past, but she wished to continue with ibuprofen at this time.  She may call us to switch to meloxicam.  -She was offered a referral to my medical spine colleagues, but she is not interested in injections in the future.  -She will follow-up in 8-10 weeks.  If not improved at that time, we should consider further advanced imaging of the lumbar and sacral region to evaluate for radiculopathy versus bone stress injury.    The patient was counseled to remain active, but avoid activities that worsen symptoms. The patient was in agreement with this plan. All questions were answered to the best of my ability.    PATIENT EDUCATION:  Education was discussed at today's appointment. A learning needs assessment was performed.    Primary learner: Jacqueline Cam  Barriers to learning: None  Preferred language: English  Learning preferences include: Seeing and doing.  Discussed: Diagnosis and treatment plan.  Demonstrated: Understanding of material discussed.  Patient education materials given: None.  Learner response: Learner demonstrated understanding.    This note was dictated using Dragon speech recognition software and was not corrected for spelling or grammatical errors.

## 2024-09-12 ENCOUNTER — APPOINTMENT (OUTPATIENT)
Dept: OTOLARYNGOLOGY | Facility: CLINIC | Age: 79
End: 2024-09-12
Payer: MEDICARE

## 2024-12-23 DIAGNOSIS — E03.9 ACQUIRED HYPOTHYROIDISM: ICD-10-CM

## 2024-12-23 RX ORDER — LEVOTHYROXINE SODIUM 88 UG/1
88 TABLET ORAL DAILY
Qty: 90 TABLET | Refills: 2 | Status: SHIPPED | OUTPATIENT
Start: 2024-12-23

## 2025-03-19 NOTE — ASSESSMENT & PLAN NOTE
Orders:    CBC and Auto Differential; Future    Comprehensive Metabolic Panel; Future    Lipid Panel; Future    Thyroid Stimulating Hormone; Future    Vitamin B12; Future    Vitamin D 25-Hydroxy,Total (for eval of Vitamin D levels); Future    Anti-SSA; Future    Anti-SSB; Future    levothyroxine (Synthroid, Levoxyl) 88 mcg tablet; Take 1 tablet daily 6 days/week

## 2025-03-19 NOTE — ASSESSMENT & PLAN NOTE
Orders:    CBC and Auto Differential; Future    Comprehensive Metabolic Panel; Future    Lipid Panel; Future    Thyroid Stimulating Hormone; Future    Vitamin B12; Future    Vitamin D 25-Hydroxy,Total (for eval of Vitamin D levels); Future    Anti-SSA; Future    Anti-SSB; Future    XR DEXA bone density; Future

## 2025-03-19 NOTE — PROGRESS NOTES
Subjective   Reason for Visit: Jacqueline Cam is an 80 y.o. female here for a Medicare Wellness visit.               HPI  This week, the patient orts a history of intermittent episodes of increased sensitivity located just under the proximal end of the right mandible.  She reports no precipitating, exacerbating factors.  She reports no other associated symptoms.  Over the past 6 months since the patient has been applying a disc to her gum, she has noted much less dry mouth.  Over the past several years, she still reports continued chronic dry throat, dry eyes-unchanged.  No other associated symptoms.    Over the past year, the patient reports experiencing alternating periods of constipation manifested as fewer bowel movements with passage of hard stool and diarrhea-manifested as frequent passage of soft solid stools.  She reports no other associated symptoms.    Since sustaining a right wrist fracture in early February 2024, the patient reports a history of intermittent episodes of aching pain over the PIP joint of the right third finger and over the anatomical snuffbox of the right hand.  She reports that the episodes of pain have been precipitated by increased use of the hand.  For the past several years, she does report continued chronic intermittent episodes of locking up of the PIP joint of the right third finger-unchanged..  For the past several years, she reports no episodes of aching pain throughout the rest of the PIP joints of the right hand.  No other associated symptoms.    Beginning in July 2024, the patient reports experiencing intermittent episodes of vertical diplopia or sensation of objects moving.  She visited her ophthalmologist who diagnosed her with hyperphoria.  He was concerned that the patient's head trauma from February 2024 was a causative factor.  Since she began wearing prisms, the patient reports that she has not noted the aforementioned symptoms.    No other new complaints.  Note that  the patient did test positive for COVID-19 in late December and experienced nasal symptoms for a period of several weeks  Patient Care Team:  Deshaun Cisneros MD as PCP - General (Internal Medicine)  Deshaun Cisneros MD as PCP - [facility] Attributed Provider     Review of Systems  All systems have been reviewed and are normal except as previously noted  Objective   Vitals:  There were no vitals taken for this visit.      Physical Exam  Head-palpation revealed no tenderness over the maxillary or frontal sinuses  Eyes-extraocular movements intact pupils equal and reactive to light; fundi revealed good retinal color no hemorrhages or exudates  Ears-palpation of pinnas and traguses revealed no tenderness. External auditory canals not erythematous or swollen.. TMs clear.   Nose-turbinates not erythematous or swollen; no septal deviation noted  Mouth-only mild xerostomia noted, posterior pharynx is not erythematous or swollen. Tonsillar pillars appeared normal no exudates  Neck no lymphadenopathy. Thyroid gland not enlarged. , No bruits  Lungs-clear to auscultation bilaterally  Cardiac-rate normal rhythm regular no murmurs no JVD  Abdomen-soft nondistended normal active bowel sounds. Palpation revealed no tenderness or masses  Pulses 2+ bilaterally in the upper extremities-0 bilaterally in the lower extremities  Extremities-no peripheral edema  Musculoskeletal  Cervical spine-no erythema or swelling, Full range of motion in all directions of motion with no pain. Palpation revealed no tenderness or increase in warmth  PIP joint right third finger-no erythema or swelling, Full range of motion in all directions of motion with no pain. Palpation revealed no tenderness or increase in warmth.  MCP joint right thumb-no erythema or swelling, Full range of motion in all directions of motion with no pain. Palpation revealed no tenderness or increase in warmth.  Palpation of the anatomical snuffbox right hand revealed no tenderness or  increase in warmth  Lumbar spine-no erythema or swelling..  Full range of motion with pain noted in the right buttock region on bending to the right.  Full range of motion with no pain noted in all other directions of motion.  Palpation revealed mild soreness over the right buttock, no increase in warmth  Skin-xerosis noted diffusely. No erythema or eruptions noted.  Varicose veins noted in the lower legs bilaterally  Neurologic  Mental status-alert and oriented x3   Cranial nerves-2 through 12 grossly intact, no visual field abnormalities  Motor-no pronator drift noted, strength-5/5 in all muscle groups tested, , no tremor noted.  No bradykinesia noted.  No rigidity noted.  Negative pull test  Sensory-Light touch sensation fully intact  Pinprick sensation fully intact  Vibratory sensation diminished in the lower extremities bilaterally equally  Cerebellar-no truncal ataxia, good coordination finger-nose testing,, good coordination heel-to-shin testing, normal rapid alternating movements  Romberg negative, no abnormality in tandem gait  Reflexes-ankle jerks 1+/4 bilaterally, all other reflexes tested 2+/4 bilaterally  Assessment & Plan  Routine general medical examination at a health care facility    Orders:    CBC and Auto Differential; Future    Comprehensive Metabolic Panel; Future    Lipid Panel; Future    Thyroid Stimulating Hormone; Future    Vitamin B12; Future    Vitamin D 25-Hydroxy,Total (for eval of Vitamin D levels); Future    Anti-SSA; Future    Anti-SSB; Future    ECG 12 Lead    Irritable bowel syndrome with predominant constipation    Orders:    CBC and Auto Differential; Future    Comprehensive Metabolic Panel; Future    Lipid Panel; Future    Thyroid Stimulating Hormone; Future    Vitamin B12; Future    Vitamin D 25-Hydroxy,Total (for eval of Vitamin D levels); Future    Anti-SSA; Future    Anti-SSB; Future    Age-related osteoporosis without current pathological fracture    Orders:    CBC and Auto  Differential; Future    Comprehensive Metabolic Panel; Future    Lipid Panel; Future    Thyroid Stimulating Hormone; Future    Vitamin B12; Future    Vitamin D 25-Hydroxy,Total (for eval of Vitamin D levels); Future    Anti-SSA; Future    Anti-SSB; Future    XR DEXA bone density; Future    Acquired hypothyroidism    Orders:    CBC and Auto Differential; Future    Comprehensive Metabolic Panel; Future    Lipid Panel; Future    Thyroid Stimulating Hormone; Future    Vitamin B12; Future    Vitamin D 25-Hydroxy,Total (for eval of Vitamin D levels); Future    Anti-SSA; Future    Anti-SSB; Future    levothyroxine (Synthroid, Levoxyl) 88 mcg tablet; Take 1 tablet daily 6 days/week    Breast screening    Orders:    BI mammo bilateral screening tomosynthesis; Future    Encounter for screening mammogram for malignant neoplasm of breast    Orders:    BI mammo bilateral screening tomosynthesis; Future            Assessment  Noncardiac chest pain  Exercise-induced asthma  COVID-19 in late December 2024  Chronic intermittent episodes of itchy eyes, mild sneezing-intermittent, intermittent postnasal drip-likely secondary to allergic rhinitis  Acute eczematoid otitis externa right ear February 17, 2024  Chronic xerostomia, dry throat, dry eyes-unsure of etiology  Alternate periods of constipation and diarrhea-probably secondary to IBS-mixed  Irritable bowel syndrome  Intermittent episodes of increased sensitivity located just under the proximal end of the right mandible-unsure of etiology.  Chronic urinary urgency-May be secondary to overactive bladder  Urinary tract infections-remote.  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  Chronic intermittent nocturnal episodes of cramping pain in either or both calf regions-over the past year extending to either or both thigh regions-may represent idiopathic nocturnal leg cramps  Raynaud's phenomenon  Osteoporosis  Hypothyroidism  Herpes genitalis-recurrent  Hyperphoria  Right lumbar radiculopathy June 24, 2024  Chronic intermittent episodes of sharp pain in the lower back region-probably secondary to osteoarthritis and degenerative disc disease of the lumbosacral spine  Compression fracture L1  Plan  Obtain EKG and urinalysis today  Obtain TSH , CBC differential, CMP, fasting lipid profile, vitamin D level, vitamin B12 level, cardiac CRP today. Obtain magnesium level as well.  Arrange for MRI/MRA of the brain given the history of the patient's visual symptoms experienced this summer.  I have put in orders for surveillance mammogram and bone density studies.  I will recommend that the patient receive 2 doses of Shingrix, 1 dose of Prevnar 20 within the next 6 months.  I also suggested that she receive 1 dose of RSV vaccine within the next 6 months  Begin Tylenol 1000 mg p.o. every 6 hours as needed pain or Aleve 440 mg p.o. twice daily as needed pain.  The patient will continue her current dosage of levothyroxine at this time pending the results of lab work.  The patient will return for an annual Medicare wellness visit in 1 year

## 2025-03-19 NOTE — ASSESSMENT & PLAN NOTE
Orders:    CBC and Auto Differential; Future    Comprehensive Metabolic Panel; Future    Lipid Panel; Future    Thyroid Stimulating Hormone; Future    Vitamin B12; Future    Vitamin D 25-Hydroxy,Total (for eval of Vitamin D levels); Future    Anti-SSA; Future    Anti-SSB; Future

## 2025-03-20 ENCOUNTER — APPOINTMENT (OUTPATIENT)
Dept: PRIMARY CARE | Facility: CLINIC | Age: 80
End: 2025-03-20
Payer: MEDICARE

## 2025-03-20 VITALS
HEART RATE: 86 BPM | BODY MASS INDEX: 22.63 KG/M2 | DIASTOLIC BLOOD PRESSURE: 74 MMHG | WEIGHT: 123 LBS | TEMPERATURE: 98.9 F | HEIGHT: 62 IN | SYSTOLIC BLOOD PRESSURE: 100 MMHG

## 2025-03-20 DIAGNOSIS — K58.1 IRRITABLE BOWEL SYNDROME WITH PREDOMINANT CONSTIPATION: ICD-10-CM

## 2025-03-20 DIAGNOSIS — Z00.00 ROUTINE GENERAL MEDICAL EXAMINATION AT A HEALTH CARE FACILITY: Primary | ICD-10-CM

## 2025-03-20 DIAGNOSIS — E03.9 ACQUIRED HYPOTHYROIDISM: ICD-10-CM

## 2025-03-20 DIAGNOSIS — Z12.39 BREAST SCREENING: ICD-10-CM

## 2025-03-20 DIAGNOSIS — Z12.31 ENCOUNTER FOR SCREENING MAMMOGRAM FOR MALIGNANT NEOPLASM OF BREAST: ICD-10-CM

## 2025-03-20 DIAGNOSIS — M81.0 AGE-RELATED OSTEOPOROSIS WITHOUT CURRENT PATHOLOGICAL FRACTURE: ICD-10-CM

## 2025-03-20 RX ORDER — LEVOTHYROXINE SODIUM 88 UG/1
TABLET ORAL
Qty: 90 TABLET | Refills: 2 | Status: SHIPPED | OUTPATIENT
Start: 2025-03-20

## 2025-03-20 RX ORDER — CHOLECALCIFEROL (VITAMIN D3) 25 MCG
2000 TABLET ORAL DAILY
Qty: 90 TABLET | Refills: 3 | Status: SHIPPED | OUTPATIENT
Start: 2025-03-20

## 2025-03-20 RX ORDER — WITCH HAZEL 50 %
PADS, MEDICATED (EA) TOPICAL
Qty: 30 TABLET | Refills: 1 | Status: SHIPPED | OUTPATIENT
Start: 2025-03-20

## 2025-03-20 ASSESSMENT — PATIENT HEALTH QUESTIONNAIRE - PHQ9
SUM OF ALL RESPONSES TO PHQ9 QUESTIONS 1 AND 2: 0
1. LITTLE INTEREST OR PLEASURE IN DOING THINGS: NOT AT ALL
2. FEELING DOWN, DEPRESSED OR HOPELESS: NOT AT ALL

## 2025-03-20 ASSESSMENT — ACTIVITIES OF DAILY LIVING (ADL)
GROCERY_SHOPPING: INDEPENDENT
TAKING_MEDICATION: INDEPENDENT
DRESSING: INDEPENDENT
BATHING: INDEPENDENT
DOING_HOUSEWORK: INDEPENDENT
MANAGING_FINANCES: INDEPENDENT

## 2025-03-20 ASSESSMENT — ENCOUNTER SYMPTOMS
OCCASIONAL FEELINGS OF UNSTEADINESS: 0
LOSS OF SENSATION IN FEET: 0
DEPRESSION: 0

## 2025-03-21 LAB
25(OH)D3+25(OH)D2 SERPL-MCNC: 61 NG/ML (ref 30–100)
ALBUMIN SERPL-MCNC: 4.7 G/DL (ref 3.6–5.1)
ALP SERPL-CCNC: 96 U/L (ref 37–153)
ALT SERPL-CCNC: 29 U/L (ref 6–29)
ANION GAP SERPL CALCULATED.4IONS-SCNC: 7 MMOL/L (CALC) (ref 7–17)
AST SERPL-CCNC: 28 U/L (ref 10–35)
BASOPHILS # BLD AUTO: 30 CELLS/UL (ref 0–200)
BASOPHILS NFR BLD AUTO: 0.6 %
BILIRUB SERPL-MCNC: 0.8 MG/DL (ref 0.2–1.2)
BUN SERPL-MCNC: 19 MG/DL (ref 7–25)
CALCIUM SERPL-MCNC: 9.8 MG/DL (ref 8.6–10.4)
CHLORIDE SERPL-SCNC: 102 MMOL/L (ref 98–110)
CHOLEST SERPL-MCNC: 211 MG/DL
CHOLEST/HDLC SERPL: 2.9 (CALC)
CO2 SERPL-SCNC: 31 MMOL/L (ref 20–32)
CREAT SERPL-MCNC: 0.67 MG/DL (ref 0.6–0.95)
EGFRCR SERPLBLD CKD-EPI 2021: 87 ML/MIN/1.73M2
ENA SS-A AB SER IA-ACNC: NORMAL AI
ENA SS-B AB SER IA-ACNC: NORMAL AI
EOSINOPHIL # BLD AUTO: 200 CELLS/UL (ref 15–500)
EOSINOPHIL NFR BLD AUTO: 4 %
ERYTHROCYTE [DISTWIDTH] IN BLOOD BY AUTOMATED COUNT: 13.4 % (ref 11–15)
GLUCOSE SERPL-MCNC: 94 MG/DL (ref 65–139)
HCT VFR BLD AUTO: 44.7 % (ref 35–45)
HDLC SERPL-MCNC: 72 MG/DL
HGB BLD-MCNC: 14.3 G/DL (ref 11.7–15.5)
LDLC SERPL CALC-MCNC: 120 MG/DL (CALC)
LYMPHOCYTES # BLD AUTO: 1320 CELLS/UL (ref 850–3900)
LYMPHOCYTES NFR BLD AUTO: 26.4 %
MCH RBC QN AUTO: 29.5 PG (ref 27–33)
MCHC RBC AUTO-ENTMCNC: 32 G/DL (ref 32–36)
MCV RBC AUTO: 92.4 FL (ref 80–100)
MONOCYTES # BLD AUTO: 470 CELLS/UL (ref 200–950)
MONOCYTES NFR BLD AUTO: 9.4 %
NEUTROPHILS # BLD AUTO: 2980 CELLS/UL (ref 1500–7800)
NEUTROPHILS NFR BLD AUTO: 59.6 %
NONHDLC SERPL-MCNC: 139 MG/DL (CALC)
PLATELET # BLD AUTO: 206 THOUSAND/UL (ref 140–400)
PMV BLD REES-ECKER: 11.7 FL (ref 7.5–12.5)
POTASSIUM SERPL-SCNC: 3.6 MMOL/L (ref 3.5–5.3)
PROT SERPL-MCNC: 7.1 G/DL (ref 6.1–8.1)
RBC # BLD AUTO: 4.84 MILLION/UL (ref 3.8–5.1)
SODIUM SERPL-SCNC: 140 MMOL/L (ref 135–146)
TRIGL SERPL-MCNC: 87 MG/DL
TSH SERPL-ACNC: 0.33 MIU/L (ref 0.4–4.5)
VIT B12 SERPL-MCNC: 378 PG/ML (ref 200–1100)
WBC # BLD AUTO: 5 THOUSAND/UL (ref 3.8–10.8)

## 2025-03-25 DIAGNOSIS — E03.9 ACQUIRED HYPOTHYROIDISM: Primary | ICD-10-CM

## 2025-03-25 DIAGNOSIS — E03.9 ACQUIRED HYPOTHYROIDISM: ICD-10-CM

## 2025-03-25 RX ORDER — LEVOTHYROXINE SODIUM 75 UG/1
75 TABLET ORAL DAILY
Qty: 30 TABLET | Refills: 11 | Status: SHIPPED | OUTPATIENT
Start: 2025-03-25 | End: 2025-03-26

## 2025-03-26 RX ORDER — LEVOTHYROXINE SODIUM 75 UG/1
TABLET ORAL
Qty: 90 TABLET | Refills: 3 | Status: SHIPPED | OUTPATIENT
Start: 2025-03-26

## 2025-03-28 ENCOUNTER — HOSPITAL ENCOUNTER (OUTPATIENT)
Dept: RADIOLOGY | Facility: CLINIC | Age: 80
Discharge: HOME | End: 2025-03-28
Payer: MEDICARE

## 2025-03-28 DIAGNOSIS — Z12.31 ENCOUNTER FOR SCREENING MAMMOGRAM FOR MALIGNANT NEOPLASM OF BREAST: ICD-10-CM

## 2025-03-28 DIAGNOSIS — Z12.39 BREAST SCREENING: ICD-10-CM

## 2025-03-28 PROCEDURE — 77063 BREAST TOMOSYNTHESIS BI: CPT

## 2025-04-17 ENCOUNTER — HOSPITAL ENCOUNTER (OUTPATIENT)
Dept: RADIOLOGY | Facility: CLINIC | Age: 80
Discharge: HOME | End: 2025-04-17
Payer: MEDICARE

## 2025-04-17 DIAGNOSIS — M81.0 AGE-RELATED OSTEOPOROSIS WITHOUT CURRENT PATHOLOGICAL FRACTURE: ICD-10-CM

## 2025-04-17 PROCEDURE — 77080 DXA BONE DENSITY AXIAL: CPT | Performed by: RADIOLOGY

## 2025-04-17 PROCEDURE — 77080 DXA BONE DENSITY AXIAL: CPT

## 2025-05-26 NOTE — PROGRESS NOTES
Subjective   Patient ID: Jacqueline Cam is a 80 y.o. female who presents for follow-up visit    HPI   Since I decreased the patient's levothyroxine dosage to 75 mcg daily 2 months ago, the patient reports that she has had more frequent episodes of constipation manifested as fewer bowel movements with passage of hard stool.  She reports no change in hair, change in voice, muscle cramping in buttocks/legs.  Review of Systems    Objective   There were no vitals taken for this visit.    Physical Exam  Neck no lymphadenopathy. Thyroid gland not enlarged. , No bruits  Lungs-clear to auscultation bilaterally  Cardiac-rate normal rhythm regular no murmurs no JVD  Abdomen-soft nondistended normal active bowel sounds. Palpation revealed no tenderness or masses    Extremities-no peripheral edema  Assessment/Plan        Assessment  More frequent episodes of constipation-May be secondary to IBS-mixed, inadequate supplementation of thyroid hormone  Hypothyroidism  Plan  Obtain TSH today.  The patient should be scheduled for an annual Medicare wellness visit in late March 2026

## 2025-05-29 ENCOUNTER — APPOINTMENT (OUTPATIENT)
Dept: PRIMARY CARE | Facility: CLINIC | Age: 80
End: 2025-05-29
Payer: COMMERCIAL

## 2025-05-29 VITALS
WEIGHT: 127 LBS | SYSTOLIC BLOOD PRESSURE: 108 MMHG | HEART RATE: 82 BPM | TEMPERATURE: 96.9 F | DIASTOLIC BLOOD PRESSURE: 80 MMHG | BODY MASS INDEX: 23.23 KG/M2

## 2025-05-29 DIAGNOSIS — E03.9 ACQUIRED HYPOTHYROIDISM: ICD-10-CM

## 2025-05-29 DIAGNOSIS — M81.0 AGE-RELATED OSTEOPOROSIS WITHOUT CURRENT PATHOLOGICAL FRACTURE: ICD-10-CM

## 2025-05-29 DIAGNOSIS — K58.1 IRRITABLE BOWEL SYNDROME WITH PREDOMINANT CONSTIPATION: Primary | ICD-10-CM

## 2025-05-29 PROCEDURE — 1159F MED LIST DOCD IN RCRD: CPT | Performed by: INTERNAL MEDICINE

## 2025-05-29 PROCEDURE — 1160F RVW MEDS BY RX/DR IN RCRD: CPT | Performed by: INTERNAL MEDICINE

## 2025-05-29 PROCEDURE — 99212 OFFICE O/P EST SF 10 MIN: CPT | Performed by: INTERNAL MEDICINE

## 2025-05-29 PROCEDURE — 1036F TOBACCO NON-USER: CPT | Performed by: INTERNAL MEDICINE

## 2025-05-29 PROCEDURE — 1126F AMNT PAIN NOTED NONE PRSNT: CPT | Performed by: INTERNAL MEDICINE

## 2025-05-29 ASSESSMENT — PAIN SCALES - GENERAL: PAINLEVEL_OUTOF10: 0-NO PAIN

## 2025-05-30 ENCOUNTER — TELEPHONE (OUTPATIENT)
Dept: PRIMARY CARE | Facility: CLINIC | Age: 80
End: 2025-05-30
Payer: COMMERCIAL

## 2025-05-30 LAB — TSH SERPL-ACNC: 0.8 MIU/L (ref 0.4–4.5)
